# Patient Record
Sex: FEMALE | Race: WHITE | NOT HISPANIC OR LATINO | Employment: UNEMPLOYED | URBAN - METROPOLITAN AREA
[De-identification: names, ages, dates, MRNs, and addresses within clinical notes are randomized per-mention and may not be internally consistent; named-entity substitution may affect disease eponyms.]

---

## 2021-06-16 ENCOUNTER — TELEPHONE (OUTPATIENT)
Dept: SURGICAL ONCOLOGY | Facility: CLINIC | Age: 41
End: 2021-06-16

## 2021-06-16 NOTE — TELEPHONE ENCOUNTER
New Patient Encounter    New Patient Intake Form   Patient Details:  Flaco Villela  1980  [id number]    Background Information:  [address] starts by opening a telephone encounter and gathering the following information   Who is calling to schedule? If not self, relationship to patient? Patient   Referring Provider [facility]   What is the diagnosis? Shyam II   Is this Cancer or Non-Cancer? Non-Cancer - PRECANCER   Is this diagnosis confirmed? Yes   When was the diagnosis? 6/2021   Is there a confirmed diagnosis from a biopsy/tissue reviewed by pathology? Yes   Were outside slides requested? NA   Is patient aware of diagnosis? Yes   Is there a personal history and what kind? No   Is there a family history and what kind? No   Reason for visit? New Diagnosis   Have you had any imaging or labs done? If so: when, where? yes  [facility]   Are records in EPIC? no   If patient has a prior history of cancer were old records obtained? NA   Was the patient told to bring a disk? No   Does the patient smoke or Vape? No   If yes, how many packs or cartridges per day? Scheduling Information:   Preferred [place]:  [facility]     Are there any dates/time the patient cannot be seen? Miscellaneous: spoke to pt to attempt to get 's social security number  [facility] does not have it and was unable to get it -- will bring everything to appt   After completing the above information, please route to Financial Counselor and the appropriate Nurse Navigator for review

## 2021-06-21 ENCOUNTER — CONSULT (OUTPATIENT)
Dept: GYNECOLOGIC ONCOLOGY | Facility: CLINIC | Age: 41
End: 2021-06-21
Payer: COMMERCIAL

## 2021-06-21 VITALS
DIASTOLIC BLOOD PRESSURE: 64 MMHG | SYSTOLIC BLOOD PRESSURE: 118 MMHG | RESPIRATION RATE: 16 BRPM | BODY MASS INDEX: 35.63 KG/M2 | TEMPERATURE: 98.9 F | WEIGHT: 227 LBS | HEART RATE: 87 BPM | HEIGHT: 67 IN

## 2021-06-21 DIAGNOSIS — N90.1 VIN II (VULVAR INTRAEPITHELIAL NEOPLASIA II): Primary | ICD-10-CM

## 2021-06-21 PROCEDURE — 99242 OFF/OP CONSLTJ NEW/EST SF 20: CPT | Performed by: OBSTETRICS & GYNECOLOGY

## 2021-06-21 NOTE — ASSESSMENT & PLAN NOTE
Patient with abnormal skin noted towards left anterior vulva by her gynecologist   This was mostly asymptomatic  Biopsy demonstrated incidental SUSY 2  Patient presents today to discuss management options  She is not really symptomatic from this lesion  On exam, biopsy site identified but there is no true grossly abnormal skin  We discussed potential options including topical therapy with Aldara, surgical excision of scar versus observation period patient is inclined to not proceed surgically now during the summer months anyway, which I have reassured her is a safe approach on the basis of slow progressing nature of high-grade dysplasia  after considering options, she has elected and I have supported her intent to undergo expectant management  She will return to the office in 2-3 months for re-evaluation  She will see Dr Salina Fernandes at our [facility] office at that time  I have encouraged her to contact us if she develops any new symptoms

## 2021-06-21 NOTE — LETTER
2021     Jony Pratt  [address]  [address]    Patient: Katelin Dias   YOB: 1980   Date of Visit: 2021       Dear Dr Mojgan Garcia: Thank you for referring Katelin Dias to me for evaluation  Below are my notes for this consultation  If you have questions, please do not hesitate to call me  I look forward to following your patient along with you  Sincerely,        Mertha Babinski, MD        CC: [place], MD Caro Harvey, MD Mertha Babinski, MD  2021  4:03 PM  Incomplete  Assessment/Plan:    {Assess/PlanSmartLinks:82534}        CHIEF COMPLAINT:       Subjective:     Problem:  Cancer Staging  No matching staging information was found for the patient  Previous therapy:  Oncology History    No history exists  Patient ID: Katelin Dias is a 38 y o  female  HPI    Review of Systems    No current outpatient medications on file  No current facility-administered medications for this visit  Allergies   Allergen Reactions    Penicillins Hives and Swelling       History reviewed  No pertinent past medical history  Past Surgical History:   Procedure Laterality Date     SECTION      3    DILATION AND EVACUATION      HERNIA REPAIR         OB History        4    Para        Term                AB        Living   3       SAB        TAB        Ectopic        Multiple        Live Births                     Family History   Problem Relation Age of Onset    Breast cancer Mother     Melanoma Maternal Aunt     Colon cancer Paternal Grandfather        {Common ambulatory SmartLinks:32476}      Objective:    Blood pressure 118/64, pulse 87, temperature 98 9 °F (37 2 °C), temperature source Tympanic, resp  rate 16, height 5' 7" (1 702 m), weight 103 kg (227 lb), last menstrual period 2021  Body mass index is 35 55 kg/m²      Physical Exam      No results found for:   No results found for: WBC, HGB, HCT, MCV, PLT  No results found for: NA, K, CL, CO2, ANIONGAP, BUN, CREATININE, GLUCOSE, GLUF, CALCIUM, CORRECTEDCA, AST, ALT, ALKPHOS, PROT, BILITOT, EGFR     Trend:  No results found for:

## 2021-06-21 NOTE — LETTER
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       Patient with abnormal skin noted towards left anterior vulva by her gynecologist   This was mostly asymptomatic  Biopsy demonstrated incidental SUSY 2  Patient presents today to discuss management options  She is not really symptomatic from this lesion  On exam, biopsy site identified but there is no true grossly abnormal skin  We discussed potential options including topical therapy with Aldara, surgical excision of scar versus observation period patient is inclined to not proceed surgically now during the summer months anyway, which I have reassured her is a safe approach on the basis of slow progressing nature of high-grade dysplasia  after considering options, she has elected and I have supported her intent to undergo expectant management  She will return to the office in 2-3 months for re-evaluation  She will see Dr Elena Carlson at our [facility] office at that time  I have encouraged her to contact us if she develops any new symptoms  CHIEF COMPLAINT:    here for consultation management for newly diagnosed high-grade vulvar dysplasia  Patient ID: Jozef Anderson is a 38 y o  female  HPI   60-year-old  with 3 prior  sections    She presents for newly diagnosed biopsy-proven SUSY 2  She presented to her new gynecologist and evaluation included pelvic ultrasound which demonstrated endometrial stripe measuring 20 3 mm ( premenopausal)  Endometrial biopsy was performed which demonstrated secretory endometrium with no hyperplasia or atypia  Reportedly, a lesion was seen on her left anterior vulva and a biopsy was obtained  This demonstrated SUSY 2  Patient was referred for consultation  She reports some questionable diffuse irritation but nothing attributable to the biopsy site or place of the lesion  Patient  had not noticed any gross abnormalities in that area but report whitening was noted at the time of biopsy/evaluation  Review of Systems    As per HPI  Twelve point review of systems otherwise noncontributory  Asymptomatic  No current outpatient medications on file  No current facility-administered medications for this visit  Allergies   Allergen Reactions    Penicillins Hives and Swelling       History reviewed  No pertinent past medical history  Past Surgical History:   Procedure Laterality Date     SECTION      3    DILATION AND EVACUATION      HERNIA REPAIR         OB History        4    Para        Term                AB        Living   3       SAB        TAB        Ectopic        Multiple        Live Births                     Family History   Problem Relation Age of Onset    Breast cancer Mother     Melanoma Maternal Aunt     Colon cancer Paternal Grandfather        The following portions of the patient's history were reviewed and updated as appropriate: allergies, current medications, past family history, past medical history, past social history, past surgical history and problem list       Objective:    Blood pressure 118/64, pulse 87, temperature 98 9 °F (37 2 °C), temperature source Tympanic, resp   rate 16, height 5' 7" (1 702 m), weight 103 kg (227 lb), last menstrual period 06/12/2021  Body mass index is 35 55 kg/m²  Physical Exam  Vitals reviewed  Exam conducted with a chaperone present  Pulmonary:      Effort: Pulmonary effort is normal    Genitourinary:     Comments:  Left anterior biopsy site noted  No residual grossly abnormal appearance skin noted  Normal Bartholin's and [place]'s glands  Normal urethral meatus and no evidence of urethral discharge or masses  Bladder without fullness mass or tenderness  Vagina without lesion Scant brownish discharge consistent with recent menstrual flow  No significant pelvic organ prolapse noted  Cervix grossly normal appearing without visible lesions  Internal exam deferred        Jesusita Guerrero MD, [address]  6/21/2021  4:08 PM

## 2021-06-21 NOTE — PROGRESS NOTES
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       Patient with abnormal skin noted towards left anterior vulva by her gynecologist   This was mostly asymptomatic  Biopsy demonstrated incidental SUSY 2  Patient presents today to discuss management options  She is not really symptomatic from this lesion  On exam, biopsy site identified but there is no true grossly abnormal skin  We discussed potential options including topical therapy with Aldara, surgical excision of scar versus observation period patient is inclined to not proceed surgically now during the summer months anyway, which I have reassured her is a safe approach on the basis of slow progressing nature of high-grade dysplasia  after considering options, she has elected and I have supported her intent to undergo expectant management  She will return to the office in 2-3 months for re-evaluation  She will see Dr Radha Blackwell at our [facility] office at that time  I have encouraged her to contact us if she develops any new symptoms  CHIEF COMPLAINT:    here for consultation management for newly diagnosed high-grade vulvar dysplasia  Patient ID: Jeannie Giraldo is a 38 y o  female  HPI   41-year-old  with 3 prior  sections  She presents for newly diagnosed biopsy-proven SUSY 2  She presented to her new gynecologist and evaluation included pelvic ultrasound which demonstrated endometrial stripe measuring 20 3 mm ( premenopausal)  Endometrial biopsy was performed which demonstrated secretory endometrium with no hyperplasia or atypia  Reportedly, a lesion was seen on her left anterior vulva and a biopsy was obtained  This demonstrated SUSY 2  Patient was referred for consultation  She reports some questionable diffuse irritation but nothing attributable to the biopsy site or place of the lesion    Patient  had not noticed any gross abnormalities in that area but report whitening was noted at the time of biopsy/evaluation  Review of Systems    As per HPI  Twelve point review of systems otherwise noncontributory  Asymptomatic  No current outpatient medications on file  No current facility-administered medications for this visit  Allergies   Allergen Reactions    Penicillins Hives and Swelling       History reviewed  No pertinent past medical history  Past Surgical History:   Procedure Laterality Date     SECTION      3    DILATION AND EVACUATION      HERNIA REPAIR         OB History        4    Para        Term                AB        Living   3       SAB        TAB        Ectopic        Multiple        Live Births                     Family History   Problem Relation Age of Onset    Breast cancer Mother     Melanoma Maternal Aunt     Colon cancer Paternal Grandfather        The following portions of the patient's history were reviewed and updated as appropriate: allergies, current medications, past family history, past medical history, past social history, past surgical history and problem list       Objective:    Blood pressure 118/64, pulse 87, temperature 98 9 °F (37 2 °C), temperature source Tympanic, resp  rate 16, height 5' 7" (1 702 m), weight 103 kg (227 lb), last menstrual period 2021  Body mass index is 35 55 kg/m²  Physical Exam  Vitals reviewed  Exam conducted with a chaperone present  Pulmonary:      Effort: Pulmonary effort is normal    Genitourinary:     Comments:  Left anterior biopsy site noted  No residual grossly abnormal appearance skin noted  Normal Bartholin's and Solomons's glands  Normal urethral meatus and no evidence of urethral discharge or masses  Bladder without fullness mass or tenderness  Vagina without lesion Scant brownish discharge consistent with recent menstrual flow  No significant pelvic organ prolapse noted  Cervix grossly normal appearing without visible lesions  Internal exam deferred        Al Hall MD, [facility]  6/21/2021  4:08 PM

## 2021-09-22 ENCOUNTER — OFFICE VISIT (OUTPATIENT)
Dept: GYNECOLOGIC ONCOLOGY | Facility: HOSPITAL | Age: 41
End: 2021-09-22
Payer: COMMERCIAL

## 2021-09-22 VITALS
RESPIRATION RATE: 16 BRPM | HEIGHT: 67 IN | WEIGHT: 223 LBS | TEMPERATURE: 97.5 F | DIASTOLIC BLOOD PRESSURE: 70 MMHG | BODY MASS INDEX: 35 KG/M2 | SYSTOLIC BLOOD PRESSURE: 128 MMHG | OXYGEN SATURATION: 98 % | HEART RATE: 81 BPM

## 2021-09-22 DIAGNOSIS — N90.1 VIN II (VULVAR INTRAEPITHELIAL NEOPLASIA II): Primary | ICD-10-CM

## 2021-09-22 PROCEDURE — 56820 COLPOSCOPY VULVA: CPT | Performed by: OBSTETRICS & GYNECOLOGY

## 2021-09-22 NOTE — LETTER
September 22, 2021     Yue Ellis  [address]  [address]    Patient: Samuel Alvarado   YOB: 1980   Date of Visit: 9/22/2021       Dear Dr Bharathi Islas: Thank you for referring Samuel Alvarado to me for evaluation  Below are my notes for this consultation  If you have questions, please do not hesitate to call me  I look forward to following your patient along with you  Sincerely,        Rakesh French MD        CC: No Recipients  Rakesh French MD  9/22/2021 10:14 AM  Sign when Signing Visit  Colposcopy    Date/Time: 9/22/2021 10:13 AM  Performed by: Rakesh French MD  Authorized by: Rakesh French MD     Consent:     Consent obtained:  Verbal    Consent given by:  Patient  Pre-procedure:     Prepped with: acetic acid    Indication:     Indications: SUSY 2  Procedure:     Procedure: Colposcopy of Vulva only      Biopsy(s): no    Post-procedure:     Findings comment:  No dysplasia or malignancy    Impression comment:  No dysplasia or malignancy      42year-old biopsy-proven SUSY 2 from June of 2021  The margins of the biopsy were positive for dysplasia but there has been no visible lesion present since the biopsy  Repeat vulvar colposcopy today did not reveal evidence of malignancy or dysplasia  I reviewed her Pap smear, pelvic ultrasound, endometrial biopsy  Her performance status is 0   1  Return in 6 months for vulvar colposcopy

## 2021-09-22 NOTE — PROGRESS NOTES
Colposcopy    Date/Time: 9/22/2021 10:13 AM  Performed by: Sepideh Graves MD  Authorized by: Sepideh Graves MD     Consent:     Consent obtained:  Verbal    Consent given by:  Patient  Pre-procedure:     Prepped with: acetic acid    Indication:     Indications: SUSY 2  Procedure:     Procedure: Colposcopy of Vulva only      Biopsy(s): no    Post-procedure:     Findings comment:  No dysplasia or malignancy    Impression comment:  No dysplasia or malignancy      42year-old biopsy-proven SUSY 2 from June of 2021  The margins of the biopsy were positive for dysplasia but there has been no visible lesion present since the biopsy  Repeat vulvar colposcopy today did not reveal evidence of malignancy or dysplasia  I reviewed her Pap smear, pelvic ultrasound, endometrial biopsy  Her performance status is 0   1  Return in 6 months for vulvar colposcopy

## 2021-09-22 NOTE — ASSESSMENT & PLAN NOTE
40-year-old biopsy-proven SUSY 2 from June of 2021  The margins of the biopsy were positive for dysplasia but there has been no visible lesion present since the biopsy  Repeat vulvar colposcopy today did not reveal evidence of malignancy or dysplasia  I reviewed her Pap smear, pelvic ultrasound, endometrial biopsy  Her performance status is 0   1  Return in 6 months for vulvar colposcopy

## 2022-01-04 ENCOUNTER — TELEPHONE (OUTPATIENT)
Dept: GYNECOLOGIC ONCOLOGY | Facility: CLINIC | Age: 42
End: 2022-01-04

## 2022-01-04 NOTE — TELEPHONE ENCOUNTER
Left patient message to move appointment 3/23 to 3/24  Dr Bianca Disla will not be seeing patients on Wednesdays, only Thursdays in [facility]

## 2022-03-24 ENCOUNTER — OFFICE VISIT (OUTPATIENT)
Dept: GYNECOLOGIC ONCOLOGY | Facility: HOSPITAL | Age: 42
End: 2022-03-24
Payer: COMMERCIAL

## 2022-03-24 VITALS
TEMPERATURE: 97.4 F | RESPIRATION RATE: 16 BRPM | WEIGHT: 229 LBS | HEART RATE: 87 BPM | SYSTOLIC BLOOD PRESSURE: 112 MMHG | OXYGEN SATURATION: 98 % | BODY MASS INDEX: 35.94 KG/M2 | HEIGHT: 67 IN | DIASTOLIC BLOOD PRESSURE: 60 MMHG

## 2022-03-24 DIAGNOSIS — N90.1 VIN II (VULVAR INTRAEPITHELIAL NEOPLASIA II): Primary | ICD-10-CM

## 2022-03-24 PROCEDURE — 56820 COLPOSCOPY VULVA: CPT | Performed by: PHYSICIAN ASSISTANT

## 2022-03-24 PROCEDURE — 99212 OFFICE O/P EST SF 10 MIN: CPT | Performed by: PHYSICIAN ASSISTANT

## 2022-03-24 NOTE — ASSESSMENT & PLAN NOTE
21-year-old with biopsy proven SUSY 2 in June 2021  Margins of biopsy were positive for dysplasia  Vulvar colposcopy is negative  Return to the office in 6 months for vulvar coloscopy

## 2022-03-24 NOTE — PROGRESS NOTES
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     56-year-old with biopsy proven SUSY 2 in June 2021  Margins of biopsy were positive for dysplasia  Vulvar colposcopy is negative  Return to the office in 6 months for vulvar coloscopy  Relevant Orders    Colposcopy            CHIEF COMPLAINT:   Vulvar colposcopy  Problem:  SUSY 2      Previous therapy:  1  Vulvar bx, June 2021  Patient ID: Shazia Becerra is a 42 y o  female  who presents for vulvar colposcopy  She denies vulvar itching, irritation or new lesions  The patient notes she underwent hysteroscopy/D&C yesterday due to an abnormal ultrasound  Her cycles are regular  She is without acute complaints  The following portions of the patient's history were reviewed and updated as appropriate: allergies, current medications, past medical history, past surgical history and problem list     Review of Systems   Constitutional: Negative  Respiratory: Negative  Gastrointestinal: Negative  Genitourinary: Negative  Skin: Negative  Psychiatric/Behavioral: Negative  No current outpatient medications on file  No current facility-administered medications for this visit  Objective:    Blood pressure 112/60, pulse 87, temperature (!) 97 4 °F (36 3 °C), temperature source Temporal, resp  rate 16, height 5' 7" (1 702 m), weight 104 kg (229 lb), last menstrual period 03/04/2022, SpO2 98 %  Body mass index is 35 87 kg/m²  Body surface area is 2 14 meters squared  Physical Exam  Vitals reviewed  Constitutional:       General: She is not in acute distress  Appearance: Normal appearance  She is not ill-appearing  HENT:      Head: Normocephalic and atraumatic  Mouth/Throat:      Mouth: Mucous membranes are moist    Eyes:      General: No scleral icterus  Right eye: No discharge           Left eye: No discharge  Conjunctiva/sclera: Conjunctivae normal    Pulmonary:      Effort: Pulmonary effort is normal    Genitourinary:     Comments: See colposcopy note  Musculoskeletal:      Right lower leg: No edema  Left lower leg: No edema  Skin:     General: Skin is warm and dry  Coloration: Skin is not jaundiced  Findings: No rash  Neurological:      General: No focal deficit present  Mental Status: She is alert and oriented to person, place, and time  Cranial Nerves: No cranial nerve deficit  Sensory: No sensory deficit  Motor: No weakness  Gait: Gait normal    Psychiatric:         Mood and Affect: Mood normal          Behavior: Behavior normal          Thought Content: Thought content normal          Judgment: Judgment normal          Colposcopy    Date/Time: 3/24/2022 1:47 PM  Performed by: Ro Mix PA-C  Authorized by: Ro Mix PA-C     Consent:     Consent obtained:  Verbal  Procedure:     Procedure: Colposcopy of Vulva only    Comments:      External female genitalia examined and grossly normal  Bathed in 5% acetic acid  No acetowhite changes  No biopsies performed  Patient tolerated the procedure well

## 2022-09-22 ENCOUNTER — OFFICE VISIT (OUTPATIENT)
Dept: GYNECOLOGIC ONCOLOGY | Facility: HOSPITAL | Age: 42
End: 2022-09-22
Payer: COMMERCIAL

## 2022-09-22 VITALS
HEART RATE: 102 BPM | SYSTOLIC BLOOD PRESSURE: 144 MMHG | HEIGHT: 67 IN | TEMPERATURE: 97.1 F | DIASTOLIC BLOOD PRESSURE: 70 MMHG | BODY MASS INDEX: 37.67 KG/M2 | RESPIRATION RATE: 16 BRPM | OXYGEN SATURATION: 98 % | WEIGHT: 240 LBS

## 2022-09-22 DIAGNOSIS — N90.1 VIN II (VULVAR INTRAEPITHELIAL NEOPLASIA II): Primary | ICD-10-CM

## 2022-09-22 PROCEDURE — 56821 COLPOSCOPY VULVA W/BIOPSY: CPT | Performed by: OBSTETRICS & GYNECOLOGY

## 2022-09-22 PROCEDURE — 88305 TISSUE EXAM BY PATHOLOGIST: CPT | Performed by: PATHOLOGY

## 2022-09-22 NOTE — PROGRESS NOTES
Colposcopy     Date/Time 9/22/2022 2:22 PM     Universal Protocol   Consent: Verbal consent obtained  Consent given by: patient  Patient understanding: patient states understanding of the procedure being performed  Patient identity confirmed: verbally with patient       Performed by  Amanda Lopez MD     Authorized by Amanda Lopez MD        Pre-procedure details     Prepped with: acetic acid       Indication    Indications: SUSY 3  Procedure Details   Procedure: Colposcopy of Vulva with Biopsy      Specimen to pathology: yes       Post-procedure      Findings comment:  AWE post left vulva    Impression comment:  SUSY 1-2     Comments       5% ascetic acid applied to the entire vulvar skin  There was an area measuring approximately 1 5 x 1 5 cm with aceto-white changes on the left casimiro vulva adjacent to the perineal body  There was also evidence of hypertrophy in that area  The site was injected with 1% lidocaine and a 3 mm punch biopsy obtained  Hemostasis was achieved using a single interrupted 4-0 Monocryl suture  She tolerated the biopsy well  No complications  41-year-old with a history of SUSY 2  Her last treatment was biopsy only in June 2021  Colposcopy today revealed a lesion consistent with SUSY 1-2 on the left casimiro vulva adjacent to the perineal body  Biopsy obtained  Performance status is 0   1  Follow-up results of biopsy and plan treatment accordingly

## 2022-09-22 NOTE — LETTER
September 22, 2022     Queta Body  [address]  [address]    Patient: Graham Cornell   YOB: 1980   Date of Visit: 9/22/2022       Dear Dr Christianson: Thank you for referring Graham Cornell to me for evaluation  Below are my notes for this consultation  If you have questions, please do not hesitate to call me  I look forward to following your patient along with you  Sincerely,        Fadumo Valle MD        CC: No Recipients  Fadumo Valle MD  9/22/2022  2:23 PM  Sign when Signing Visit     Colposcopy     Date/Time 9/22/2022 2:22 PM     Universal Protocol   Consent: Verbal consent obtained  Consent given by: patient  Patient understanding: patient states understanding of the procedure being performed  Patient identity confirmed: verbally with patient       Performed by  Fadumo Valle MD     Authorized by Fadumo Valle MD        Pre-procedure details     Prepped with: acetic acid       Indication    Indications: SUSY 3  Procedure Details   Procedure: Colposcopy of Vulva with Biopsy      Specimen to pathology: yes       Post-procedure      Findings comment:  AWE post left vulva    Impression comment:  SUSY 1-2     Comments       5% ascetic acid applied to the entire vulvar skin  There was an area measuring approximately 1 5 x 1 5 cm with aceto-white changes on the left casimiro vulva adjacent to the perineal body  There was also evidence of hypertrophy in that area  The site was injected with 1% lidocaine and a 3 mm punch biopsy obtained  Hemostasis was achieved using a single interrupted 4-0 Monocryl suture  She tolerated the biopsy well  No complications  65-year-old with a history of SUSY 2  Her last treatment was biopsy only in June 2021  Colposcopy today revealed a lesion consistent with SUSY 1-2 on the left casimiro vulva adjacent to the perineal body  Biopsy obtained    Performance status is 0   1  Follow-up results of biopsy and plan treatment accordingly

## 2022-09-22 NOTE — ASSESSMENT & PLAN NOTE
41-year-old with a history of SUSY 2  Her last treatment was biopsy only in June 2021  Colposcopy today revealed a lesion consistent with SUSY 1-2 on the left casimiro vulva adjacent to the perineal body  Biopsy obtained  Performance status is 0   1  Follow-up results of biopsy and plan treatment accordingly

## 2023-05-31 ENCOUNTER — TELEPHONE (OUTPATIENT)
Dept: HEMATOLOGY ONCOLOGY | Facility: CLINIC | Age: 43
End: 2023-05-31

## 2023-05-31 NOTE — TELEPHONE ENCOUNTER
Appointment Schedule   Who are you speaking with? Patient   If it is not the patient, are they listed on an active communication consent form? N/A   Which provider is the appointment scheduled with? Dr Cande Lim   At which location is the appointment scheduled for? [place]   When is the appointment scheduled? Please list date and time 06/29 at 10:15am    What is the reason for this appointment? Jean Follow up    Did patient voice understanding of the details of this appointment? Yes   Was the no show policy reviewed with patient?  Yes

## 2023-06-20 ENCOUNTER — TELEPHONE (OUTPATIENT)
Dept: HEMATOLOGY ONCOLOGY | Facility: CLINIC | Age: 43
End: 2023-06-20

## 2023-06-20 NOTE — TELEPHONE ENCOUNTER
Appointment Change  Cancel, Reschedule, Change to Virtual      Who are you speaking with? Patient   If it is not the patient, are they listed on an active communication consent form? N/A   Which provider is the appointment scheduled with? Dr Enrique Uriostegui   When is the appointment scheduled? Please list date and time  06/29/2023 @10:15AM    At which location is the appointment scheduled to take place? [place]   Was the appointment rescheduled or changed from an in person visit to a virtual visit? If so, please list the details of the change  Yes, 06/22/2023 @ 2:15PM    What is the reason for the appointment change? Scheduling conflict    Was [facility] transport scheduled for this visit? No   Does [facility] transport need to be scheduled for the new visit (if applicable) No   Does the patient need an infusion appointment rescheduled? No   Does the patient have an infusion appointment scheduled? If so, when? No   Is the patient undergoing chemotherapy? No   Was the no-show policy reviewed for appointments being changed with less then 24 hours of notice?  No

## 2023-06-22 ENCOUNTER — OFFICE VISIT (OUTPATIENT)
Age: 43
End: 2023-06-22

## 2023-06-22 VITALS
WEIGHT: 238 LBS | DIASTOLIC BLOOD PRESSURE: 80 MMHG | RESPIRATION RATE: 18 BRPM | HEIGHT: 67 IN | SYSTOLIC BLOOD PRESSURE: 140 MMHG | TEMPERATURE: 98 F | BODY MASS INDEX: 37.35 KG/M2 | HEART RATE: 83 BPM | OXYGEN SATURATION: 98 %

## 2023-06-22 DIAGNOSIS — N90.1 VIN II (VULVAR INTRAEPITHELIAL NEOPLASIA II): Primary | ICD-10-CM

## 2023-06-22 PROCEDURE — 88305 TISSUE EXAM BY PATHOLOGIST: CPT | Performed by: PATHOLOGY

## 2023-06-22 PROCEDURE — 88312 SPECIAL STAINS GROUP 1: CPT | Performed by: PATHOLOGY

## 2023-06-22 PROCEDURE — 88342 IMHCHEM/IMCYTCHM 1ST ANTB: CPT | Performed by: PATHOLOGY

## 2023-06-22 PROCEDURE — 88344 IMHCHEM/IMCYTCHM EA MLT ANTB: CPT | Performed by: PATHOLOGY

## 2023-06-22 RX ORDER — FAMOTIDINE 10 MG
20 TABLET ORAL 2 TIMES DAILY
COMMUNITY

## 2023-06-22 RX ORDER — CLOBETASOL PROPIONATE 0.5 MG/G
OINTMENT TOPICAL
COMMUNITY
Start: 2023-03-29

## 2023-06-22 NOTE — LETTER
June 22, 2023     [employer]  [address]  [facility]    Patient: Nohemi Nelson   YOB: 1980   Date of Visit: 6/22/2023       Dear Dr Aaron Canales: Thank you for referring Nohemi Nelson to me for evaluation  Below are my notes for this consultation  If you have questions, please do not hesitate to call me  I look forward to following your patient along with you  Sincerely,        Josiah Perales MD        CC: No Recipients    Josiah Perales MD  6/22/2023  4:55 PM  Sign when Signing Visit     Colposcopy     Date/Time 6/22/2023 2:15 PM     Universal Protocol   Consent: Verbal consent obtained  Consent given by: patient  Patient understanding: patient states understanding of the procedure being performed  Patient identity confirmed: verbally with patient       Performed by  Josiah Perales MD   Authorized by Josiah Perales MD       Pre-procedure details     Prepped with: acetic acid      Local anesthetic:  Lidocaine 1% w/o epi     Indication    Indications: SUSY 2  Procedure Details   Procedure: Colposcopy of Vulva with Biopsy      Biopsy(s): yes      Location:  Right perineal body    Specimen to pathology: yes       Post-procedure      Findings: White epithelium      Findings comment:  Papillations at the right perineal body    Impression comment:  SUSY 1 versus inflammatory change  Patient tolerance of procedure: Tolerated well, no immediate complications     Comments       36year-old with a history of SUSY 2  Her last treatment was biopsy only in June 2021  Last colposcopic biopsy on 3/62/1650 revealed lichen simplex chronicus  Vulvar colposcopy today revealed acetowhite epithelium around the introitus consistent with inflammatory changes  There was a papillary lesion present at the right perineal body  Biopsy was obtained    Her performance status is 0   1   Follow-up results of vulvar biopsy and plan treatment accordingly  2   We discussed using clobetasol ointment to reduce inflammatory change nightly for 6 weeks  She will then follow-up for repeat colposcopy

## 2023-06-22 NOTE — PROGRESS NOTES
Colposcopy     Date/Time 6/22/2023 2:15 PM     Universal Protocol   Consent: Verbal consent obtained  Consent given by: patient  Patient understanding: patient states understanding of the procedure being performed  Patient identity confirmed: verbally with patient       Performed by  Ziggy Duarte MD   Authorized by Ziggy Duarte MD       Pre-procedure details     Prepped with: acetic acid      Local anesthetic:  Lidocaine 1% w/o epi     Indication    Indications: SUSY 2  Procedure Details   Procedure: Colposcopy of Vulva with Biopsy      Biopsy(s): yes      Location:  Right perineal body    Specimen to pathology: yes       Post-procedure      Findings: White epithelium      Findings comment:  Papillations at the right perineal body    Impression comment:  SUSY 1 versus inflammatory change  Patient tolerance of procedure: Tolerated well, no immediate complications     Comments       36year-old with a history of SUSY 2  Her last treatment was biopsy only in June 2021  Last colposcopic biopsy on 3/03/4037 revealed lichen simplex chronicus  Vulvar colposcopy today revealed acetowhite epithelium around the introitus consistent with inflammatory changes  There was a papillary lesion present at the right perineal body  Biopsy was obtained  Her performance status is 0   1   Follow-up results of vulvar biopsy and plan treatment accordingly  2   We discussed using clobetasol ointment to reduce inflammatory change nightly for 6 weeks  She will then follow-up for repeat colposcopy

## 2023-06-22 NOTE — ASSESSMENT & PLAN NOTE
40-year-old with a history of SUSY 2  Her last treatment was biopsy only in June 2021  Last colposcopic biopsy on 6/05/5325 revealed lichen simplex chronicus  Vulvar colposcopy today revealed acetowhite epithelium around the introitus consistent with inflammatory changes  There was a papillary lesion present at the right perineal body  Biopsy was obtained  Her performance status is 0   1   Follow-up results of vulvar biopsy and plan treatment accordingly  2   We discussed using clobetasol ointment to reduce inflammatory change nightly for 6 weeks  She will then follow-up for repeat colposcopy

## 2023-07-03 PROCEDURE — 88312 SPECIAL STAINS GROUP 1: CPT | Performed by: PATHOLOGY

## 2023-07-03 PROCEDURE — 88305 TISSUE EXAM BY PATHOLOGIST: CPT | Performed by: PATHOLOGY

## 2023-07-03 PROCEDURE — 88342 IMHCHEM/IMCYTCHM 1ST ANTB: CPT | Performed by: PATHOLOGY

## 2023-07-03 PROCEDURE — 88344 IMHCHEM/IMCYTCHM EA MLT ANTB: CPT | Performed by: PATHOLOGY

## 2023-08-03 ENCOUNTER — OFFICE VISIT (OUTPATIENT)
Age: 43
End: 2023-08-03

## 2023-08-03 VITALS
HEIGHT: 67 IN | WEIGHT: 238.8 LBS | HEART RATE: 86 BPM | OXYGEN SATURATION: 97 % | DIASTOLIC BLOOD PRESSURE: 76 MMHG | RESPIRATION RATE: 18 BRPM | BODY MASS INDEX: 37.48 KG/M2 | SYSTOLIC BLOOD PRESSURE: 122 MMHG | TEMPERATURE: 98.7 F

## 2023-08-03 DIAGNOSIS — N90.1 VIN II (VULVAR INTRAEPITHELIAL NEOPLASIA II): Primary | ICD-10-CM

## 2023-08-03 RX ORDER — OMEPRAZOLE 20 MG/1
20 CAPSULE, DELAYED RELEASE ORAL DAILY
COMMUNITY
Start: 2023-06-28

## 2023-08-03 RX ORDER — DIPHENOXYLATE HYDROCHLORIDE AND ATROPINE SULFATE 2.5; .025 MG/1; MG/1
1 TABLET ORAL DAILY
COMMUNITY

## 2023-08-03 NOTE — PROGRESS NOTES
Colposcopy     Date/Time 8/3/2023 10:15 AM     Universal Protocol   Consent: Verbal consent obtained. Consent given by: patient  Patient understanding: patient states understanding of the procedure being performed  Patient identity confirmed: verbally with patient       Performed by  Nelson Weir MD   Authorized by Nelson Weir MD       Pre-procedure details     Prepped with: acetic acid       Indication    Indications: vin2. Procedure Details   Procedure: Colposcopy of Vulva only       Post-procedure      Findings: White epithelium      Impression comment:  Inflammatory change. Patient tolerance of procedure: Tolerated well, no immediate complications     Comments       36year-old with a history of SUSY 2. Her last treatment was biopsy only in June 2021. Colposcopic biopsy 0/76/4655 revealed lichen simplex chronicus. Biopsy 6/22/2023 revealed seborrheic keratosis without evidence of dysplasia. Colposcopy today revealed acetowhite epithelium around the introitus consistent with inflammatory changes, stable from previous. The perineal body lesion is smaller. Her performance status is 0.  1.  Return in 6 months for vulvar colposcopy. 2.  Continue using clobetasol twice weekly or as needed.

## 2023-08-03 NOTE — ASSESSMENT & PLAN NOTE
25-year-old with a history of SUSY 2. Her last treatment was biopsy only in June 2021. Colposcopic biopsy 3/06/0840 revealed lichen simplex chronicus. Biopsy 6/22/2023 revealed seborrheic keratosis without evidence of dysplasia. Colposcopy today revealed acetowhite epithelium around the introitus consistent with inflammatory changes, stable from previous. The perineal body lesion is smaller. Her performance status is 0.  1.  Return in 6 months for vulvar colposcopy. 2.  Continue using clobetasol twice weekly or as needed.

## 2023-12-27 ENCOUNTER — OFFICE VISIT (OUTPATIENT)
Dept: URGENT CARE | Facility: CLINIC | Age: 43
End: 2023-12-27
Payer: COMMERCIAL

## 2023-12-27 VITALS
RESPIRATION RATE: 19 BRPM | HEART RATE: 87 BPM | SYSTOLIC BLOOD PRESSURE: 155 MMHG | OXYGEN SATURATION: 99 % | TEMPERATURE: 98.9 F | DIASTOLIC BLOOD PRESSURE: 79 MMHG

## 2023-12-27 DIAGNOSIS — J02.0 STREP PHARYNGITIS: Primary | ICD-10-CM

## 2023-12-27 DIAGNOSIS — J02.9 SORE THROAT: ICD-10-CM

## 2023-12-27 LAB — S PYO AG THROAT QL: NEGATIVE

## 2023-12-27 PROCEDURE — 99283 EMERGENCY DEPT VISIT LOW MDM: CPT | Performed by: FAMILY MEDICINE

## 2023-12-27 PROCEDURE — G0382 LEV 3 HOSP TYPE B ED VISIT: HCPCS | Performed by: FAMILY MEDICINE

## 2023-12-27 PROCEDURE — 87070 CULTURE OTHR SPECIMN AEROBIC: CPT | Performed by: FAMILY MEDICINE

## 2023-12-27 PROCEDURE — 87880 STREP A ASSAY W/OPTIC: CPT | Performed by: FAMILY MEDICINE

## 2023-12-27 RX ORDER — CEPHALEXIN 500 MG/1
500 CAPSULE ORAL EVERY 12 HOURS SCHEDULED
Qty: 20 CAPSULE | Refills: 0 | Status: SHIPPED | OUTPATIENT
Start: 2023-12-27 | End: 2024-01-06

## 2023-12-27 NOTE — PROGRESS NOTES
[facility]        NAME: Jacklyn De Santiago is a 43 y.o. female  : 1980    MRN: [id number]  DATE: 2023  TIME: 4:48 PM    Assessment and Plan   Strep pharyngitis [J02.0]  1. Strep pharyngitis  cephalexin (KEFLEX) 500 mg capsule      2. Sore throat  POCT rapid strepA            Patient Instructions       Follow up with PCP in 3-5 days.  Proceed to  ER if symptoms worsen.    Chief Complaint     Chief Complaint   Patient presents with    Sore Throat     Sore throat, started late last night. Glands feel swollen. Ears feel irritated. Slight clearing of the throat. headaches         History of Present Illness       43-year-old female with 2-day history of sore throat and painful swallowing.        Review of Systems   Review of Systems   Constitutional: Negative.    HENT:  Positive for sore throat.    Eyes: Negative.    Respiratory: Negative.     Cardiovascular: Negative.    Gastrointestinal: Negative.    Genitourinary: Negative.    Musculoskeletal: Negative.    Skin: Negative.    Allergic/Immunologic: Negative.    Neurological: Negative.    Hematological: Negative.    Psychiatric/Behavioral: Negative.           Current Medications       Current Outpatient Medications:     cephalexin (KEFLEX) 500 mg capsule, Take 1 capsule (500 mg total) by mouth every 12 (twelve) hours for 10 days, Disp: 20 capsule, Rfl: 0    clobetasol (TEMOVATE) 0.05 % ointment, PLEASE SEE ATTACHED FOR DETAILED DIRECTIONS, Disp: , Rfl:     famotidine (PEPCID) 10 mg tablet, Take 20 mg by mouth 2 (two) times a day, Disp: , Rfl:     multivitamin (THERAGRAN) TABS, Take 1 tablet by mouth daily, Disp: , Rfl:     omeprazole (PriLOSEC) 20 mg delayed release capsule, Take 20 mg by mouth daily, Disp: , Rfl:     Current Allergies     Allergies as of 2023 - Reviewed 2023   Allergen Reaction Noted    Penicillins Hives and Swelling 2015            The following portions of the patient's history were reviewed and updated as  appropriate: allergies, current medications, past family history, past medical history, past social history, past surgical history and problem list.     History reviewed. No pertinent past medical history.    Past Surgical History:   Procedure Laterality Date     SECTION      3    DILATION AND EVACUATION      HERNIA REPAIR         Family History   Problem Relation Age of Onset    Breast cancer Mother     Melanoma Maternal Aunt     Colon cancer Paternal Grandfather          Medications have been verified.        Objective   /79   Pulse 87   Temp 98.9 °F (37.2 °C)   Resp 19   SpO2 99%   No LMP recorded.       Physical Exam     Physical Exam  Vitals and nursing note reviewed.   Constitutional:       Appearance: She is well-developed.   HENT:      Head: Normocephalic.      Nose: Nose normal.      Mouth/Throat:      Pharynx: Pharyngeal swelling, oropharyngeal exudate and posterior oropharyngeal erythema present.   Eyes:      Pupils: Pupils are equal, round, and reactive to light.   Cardiovascular:      Rate and Rhythm: Normal rate.   Pulmonary:      Effort: Pulmonary effort is normal.   Abdominal:      General: Abdomen is flat.   Musculoskeletal:         General: Normal range of motion.      Cervical back: Normal range of motion.   Skin:     General: Skin is warm and dry.   Neurological:      Mental Status: She is alert and oriented to person, place, and time.

## 2023-12-29 LAB — BACTERIA THROAT CULT: NORMAL

## 2024-02-08 ENCOUNTER — OFFICE VISIT (OUTPATIENT)
Age: 44
End: 2024-02-08

## 2024-02-08 VITALS
BODY MASS INDEX: 38.58 KG/M2 | TEMPERATURE: 98 F | HEART RATE: 108 BPM | WEIGHT: 245.8 LBS | SYSTOLIC BLOOD PRESSURE: 120 MMHG | RESPIRATION RATE: 18 BRPM | DIASTOLIC BLOOD PRESSURE: 78 MMHG | OXYGEN SATURATION: 96 % | HEIGHT: 67 IN

## 2024-02-08 DIAGNOSIS — N90.1 VIN II (VULVAR INTRAEPITHELIAL NEOPLASIA II): Primary | ICD-10-CM

## 2024-02-08 DIAGNOSIS — B37.31 VAGINAL CANDIDIASIS: ICD-10-CM

## 2024-02-08 PROCEDURE — 88312 SPECIAL STAINS GROUP 1: CPT | Performed by: PATHOLOGY

## 2024-02-08 PROCEDURE — 88305 TISSUE EXAM BY PATHOLOGIST: CPT | Performed by: PATHOLOGY

## 2024-02-08 PROCEDURE — 88344 IMHCHEM/IMCYTCHM EA MLT ANTB: CPT | Performed by: PATHOLOGY

## 2024-02-08 RX ORDER — FLUCONAZOLE 150 MG/1
150 TABLET ORAL ONCE
Qty: 2 TABLET | Refills: 0 | Status: SHIPPED | OUTPATIENT
Start: 2024-02-08 | End: 2024-02-08

## 2024-02-08 NOTE — ASSESSMENT & PLAN NOTE
43-year-old with with a history of SUSY 2, lichen simplex chronicus, seborrheic keratosis.  Colposcopy of the vulva today revealed acetowhite epithelium at the left superior vulva consistent with inflammatory change.  There was also acetowhite epithelium at the introitus consistent with inflammatory change, stable.  Biopsy obtained of the left superior vulvar lesion.  Her performance status is 0.  1.  Follow-up results of vulvar biopsy and plan treatment accordingly.  2.  She was encouraged to continue using clobetasol ointment 1 week after the biopsy.

## 2024-02-08 NOTE — PROGRESS NOTES
Colposcopy     Date/Time  2/8/2024 1:45 PM     Universal Protocol   Consent: Verbal consent obtained.  Consent given by: patient  Patient identity confirmed: verbally with patient     Performed by  Trevor Blancas MD   Authorized by  Trevor Blancas MD     Pre-procedure details      Local anesthetic:  Lidocaine 1% w/o epi   Indication    Indications: SUSY 2.   Procedure Details   Procedure: Colposcopy of Vulva with Biopsy      Biopsy(s): yes      Location:  Left superior vulva   Post-procedure      Findings: White epithelium      Impression comment:  Inflammation    Patient tolerance of procedure:  Tolerated well, no immediate complications   Comments       Application of 5% acetic acid revealed a 2 mm focus of raised acetowhite epithelium of the left superior vulva with a small skin fissure at that location.  There was additional acetowhite epithelium at the posterior fourchette which did not have the appearance of dysplasia and was more consistent with inflammatory change.  A 3 mm punch biopsy was obtained.  Hemostasis was achieved using a single interrupted 3-0 Vicryl suture.  Speculum examination revealed a thick white discharge with clumping consistent with possible vaginal candidal infection.  43-year-old with with a history of SUSY 2, lichen simplex chronicus, seborrheic keratosis.  Colposcopy of the vulva today revealed acetowhite epithelium at the left superior vulva consistent with inflammatory change.  There was also acetowhite epithelium at the introitus consistent with inflammatory change, stable.  Biopsy obtained of the left superior vulvar lesion.  Her performance status is 0.  1.  Follow-up results of vulvar biopsy and plan treatment accordingly.  2.  She was encouraged to continue using clobetasol ointment 1 week after the biopsy.

## 2024-08-08 ENCOUNTER — OFFICE VISIT (OUTPATIENT)
Age: 44
End: 2024-08-08
Payer: COMMERCIAL

## 2024-08-08 VITALS
BODY MASS INDEX: 38.14 KG/M2 | HEART RATE: 82 BPM | DIASTOLIC BLOOD PRESSURE: 84 MMHG | RESPIRATION RATE: 18 BRPM | TEMPERATURE: 98.9 F | SYSTOLIC BLOOD PRESSURE: 118 MMHG | HEIGHT: 67 IN | WEIGHT: 243 LBS | OXYGEN SATURATION: 98 %

## 2024-08-08 DIAGNOSIS — N90.1 VIN II (VULVAR INTRAEPITHELIAL NEOPLASIA II): Primary | ICD-10-CM

## 2024-08-08 PROCEDURE — 56820 COLPOSCOPY VULVA: CPT | Performed by: STUDENT IN AN ORGANIZED HEALTH CARE EDUCATION/TRAINING PROGRAM

## 2024-08-08 RX ORDER — SENNOSIDES 8.6 MG
650 CAPSULE ORAL AS NEEDED
COMMUNITY

## 2024-08-08 NOTE — PROGRESS NOTES
Colposcopy     Date/Time  8/8/2024 9:45 AM     [place] Protocol   Consent: Verbal consent obtained.  Risks and benefits: risks, benefits and alternatives were discussed  Consent given by: patient  Patient understanding: patient states understanding of the procedure being performed  Patient identity confirmed: verbally with patient     Performed by  Jorge Delatorre MD   Authorized by  Trevor Blancas MD     Pre-procedure details      Prepped with: acetic acid     Indication    LSIL   Procedure Details   Procedure: Colposcopy of Vulva only      Under satisfactory analgesia the patient was prepped and draped in the dorsal lithotomy position: yes      Biopsy(s): no     Post-procedure      Findings: White epithelium      Findings comment:  Thin, white epithelium at 12-1 o'clock position    Impression comment:  LSIL c/w prior biopsy    Patient tolerance of procedure:  Tolerated well, no immediate complications   Comments       Application of 5% acetic acid revealed a 2 mm focus of raised acetowhite epithelium of the base of the left superior vulva at 12- 1 o'clock. There was no evidence of acetowhite at the posterior fourchette. A speculum exam was not performed.     44 y.o.  with a history of SUSY 2, lichen simplex chronicus, seborrheic keratosis.  Most recent biopsy in 2/2024 demonstrated LSIL. Colposcopy of the vulva today revealed acetowhite epithelium at the left superior vulva consistent with LSIL, consistent with prior biopsy result.  Her performance status is 0.  1.   We reviewed the pathogenesis of LSIL of the vulva. We recommended continued surveillance or consideration of use of Aldara for treatment of LSIL. We reviewed the side effects of vulvar irritation, burning, erythema, and systemic flu-like symptoms. She elects to continue surveillance. Return to office for repeat colposcopy in 6 months   2.  She was encouraged to continue using clobetasol ointment as needed for vulvar itching or  irritation.      Dr. Blancas was present and participated in the colposcopy procedure.

## 2025-02-05 ENCOUNTER — TELEPHONE (OUTPATIENT)
Age: 45
End: 2025-02-05

## 2025-02-05 NOTE — TELEPHONE ENCOUNTER
Pt would like to inform Dr. Blancas that based on some her recent labs her PCP recommended possibly to have a pelvis scan ordered. Pt stated that her PCP stated they were going to fax some notes over to Dr. Blancas's office but pt will also bring that documentation with her to her appt tomorrow so that it can be reviewed.

## 2025-02-05 NOTE — TELEPHONE ENCOUNTER
Pt calling in regarding her appointment tomorrow with Dr. Blancas. Pt nervous with the weather conditions expected for tomorrow and inquired about rescheduling, Dr. Blancas's next available in  was not until April. Pt did not want to wait that long for a follow up, offered sooner appointments in [place], as pt is coming from [place],  is closest. Dr. Blancas did have later appointments available as well that I offered, pt stated she will call back and think about it as she would have to bring her 10 year old twins with her to the appointment as they have an appointment in [place] as well. Informed pt that she can call us back once she decides and whomever answers will be able to assist her as well if the appointments with Dr. Blancas are still available. Pt verbalized understanding.       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

## 2025-02-06 ENCOUNTER — OFFICE VISIT (OUTPATIENT)
Age: 45
End: 2025-02-06
Payer: COMMERCIAL

## 2025-02-06 VITALS
TEMPERATURE: 98.5 F | OXYGEN SATURATION: 98 % | DIASTOLIC BLOOD PRESSURE: 78 MMHG | HEIGHT: 67 IN | SYSTOLIC BLOOD PRESSURE: 128 MMHG | BODY MASS INDEX: 38.77 KG/M2 | RESPIRATION RATE: 18 BRPM | WEIGHT: 247 LBS | HEART RATE: 89 BPM

## 2025-02-06 DIAGNOSIS — D50.0 IRON DEFICIENCY ANEMIA DUE TO CHRONIC BLOOD LOSS: ICD-10-CM

## 2025-02-06 DIAGNOSIS — N90.1 VIN II (VULVAR INTRAEPITHELIAL NEOPLASIA II): Primary | ICD-10-CM

## 2025-02-06 DIAGNOSIS — Z12.11 ENCOUNTER FOR SCREENING COLONOSCOPY: ICD-10-CM

## 2025-02-06 PROCEDURE — 99214 OFFICE O/P EST MOD 30 MIN: CPT | Performed by: OBSTETRICS & GYNECOLOGY

## 2025-02-06 PROCEDURE — 56820 COLPOSCOPY VULVA: CPT | Performed by: OBSTETRICS & GYNECOLOGY

## 2025-02-06 RX ORDER — DIPHENHYDRAMINE HCL 25 MG
25 CAPSULE ORAL AS NEEDED
COMMUNITY

## 2025-02-06 NOTE — ASSESSMENT & PLAN NOTE
Hemoglobin 9.7 g/dL with decreased ferritin on 1/24/2025.  She is not currently having abnormal vaginal bleeding.  Pelvic examination was benign.  1.  Plan for transvaginal and pelvic ultrasound as well as CT imaging.  2.  She will follow-up with gastroenterology for colonoscopy as scheduled.  Orders:    US pelvis complete w transvaginal; Future    CT abdomen pelvis w contrast; Future    Ambulatory Referral to Gastroenterology; Future

## 2025-02-06 NOTE — LETTER
2025     Marilin Berry  [address]  [address]  [place] [address]    Patient: Jacklyn De Santiago   YOB: 1980   Date of Visit: 2025       Dear Dr. Berry:    Thank you for referring Jacklyn De Santiago to me for evaluation. Below are my notes for this consultation.    If you have questions, please do not hesitate to call me. I look forward to following your patient along with you.         Sincerely,        Trevor Blancas MD        CC: MD Trevor Gonsales MD  2025  2:21 PM  Sign when Signing Visit  Name: Jacklyn De Santiago      : 1980      MRN: [id number]  Encounter Provider: Trevor Blancas MD  Encounter Date: 2025   Encounter department: [facility] GYNECOLOGY ONCOL[facility]  :  Assessment & Plan  SUSY II (vulvar intraepithelial neoplasia II)  44-year-old with a history of SUSY 2, lichen simplex chronicus, seborrheic keratosis.  Colposcopy of the vulva did not reveal any recurrent dysplasia or malignancy.  Last vulvar biopsy was 2024 and was consistent with SUSY 1.  Performance status is 0.  1.  Return in 6 months for vulvar colposcopy       Iron deficiency anemia due to chronic blood loss  Hemoglobin 9.7 g/dL with decreased ferritin on 2025.  She is not currently having abnormal vaginal bleeding.  Pelvic examination was benign.  1.  Plan for transvaginal and pelvic ultrasound as well as CT imaging.  2.  She will follow-up with gastroenterology for colonoscopy as scheduled.  Orders:  •  US pelvis complete w transvaginal; Future  •  CT abdomen pelvis w contrast; Future  •  Ambulatory Referral to Gastroenterology; Future    Encounter for screening colonoscopy  See above  Orders:  •  Ambulatory Referral to Gastroenterology; Future            History of Present Illness  Reason for Visit / CC: Follow-up SUSY 2/SUSY 1.  New diagnosis of iron deficiency anemia   Jacklyn De Santiago is a 44  y.o. female   Who returns for evaluation of vulvar dysplasia.  She has not been using clobetasol ointment recently.  She does not have vulvar itching or burning.  She was also noted to have anemia with a hemoglobin of 9.7 g/dL on 1/24/2025.  CMP was normal.  Ferritin was noted to be low.  She has reached out to gastroenterology and is going to have a colonoscopy.  She is not currently having abnormal vaginal bleeding.  She was concerned that maybe there was some blood in the stool.  She is otherwise able to perform her actives of daily living without difficulty.      Pertinent Medical History        Review of Systems   Constitutional:  Negative for activity change and unexpected weight change.   HENT: Negative.     Eyes: Negative.    Respiratory: Negative.     Cardiovascular: Negative.    Gastrointestinal:  Positive for blood in stool. Negative for abdominal distention and abdominal pain.   Endocrine: Negative.    Genitourinary:  Negative for pelvic pain and vaginal bleeding.   Musculoskeletal: Negative.    Skin: Negative.    Allergic/Immunologic: Negative.    Neurological: Negative.    Hematological: Negative.    Psychiatric/Behavioral: Negative.      A complete review of systems is negative other than that noted above in the HPI.  Current Outpatient Medications on File Prior to Visit   Medication Sig Dispense Refill   • acetaminophen (TYLENOL) 650 mg CR tablet Take 650 mg by mouth as needed for mild pain     • clobetasol (TEMOVATE) 0.05 % ointment PLEASE SEE ATTACHED FOR DETAILED DIRECTIONS     • diphenhydrAMINE (BENADRYL) 25 mg capsule Take 25 mg by mouth as needed for itching     • famotidine (PEPCID) 10 mg tablet Take 20 mg by mouth 2 (two) times a day     • omeprazole (PriLOSEC) 20 mg delayed release capsule Take 20 mg by mouth daily     • multivitamin (THERAGRAN) TABS Take 1 tablet by mouth daily (Patient not taking: Reported on 2/6/2025)       No current facility-administered medications on file prior to  "visit.         Objective  /78 (BP Location: Left arm, Patient Position: Sitting, Cuff Size: Large)   Pulse 89   Temp 98.5 °F (36.9 °C)   Resp 18   Ht 5' 7\" (1.702 m)   Wt 112 kg (247 lb)   SpO2 98%   BMI 38.69 kg/m²     Body mass index is 38.69 kg/m².  Pain Screening:  Pain Score: 0-No pain  ECOG   0  Physical Exam  Vitals reviewed. Exam conducted with a chaperone present.   Constitutional:       General: She is not in acute distress.     Appearance: Normal appearance. She is well-developed. She is not ill-appearing, toxic-appearing or diaphoretic.   HENT:      Head: Normocephalic and atraumatic.   Eyes:      General: No scleral icterus.     Extraocular Movements: Extraocular movements intact.      Conjunctiva/sclera: Conjunctivae normal.   Neck:      Thyroid: No thyromegaly.   Pulmonary:      Effort: Pulmonary effort is normal.   Abdominal:      General: There is no distension.      Palpations: Abdomen is soft. There is no mass.      Tenderness: There is no abdominal tenderness. There is no guarding or rebound.      Hernia: No hernia is present.   Genitourinary:     Comments: External female genitalia normal.  No evidence of mass or lesion.  Speculum semination reveals a grossly normal vagina and cervix.  There is no evidence of bleeding.  Bimanual examination reveals a mobile uterus without evidence of palpable adnexal mass.  No parametrial disease.  Musculoskeletal:         General: No swelling or tenderness.      Cervical back: Normal range of motion and neck supple.      Right lower leg: No edema.      Left lower leg: No edema.   Lymphadenopathy:      Cervical: No cervical adenopathy.   Skin:     General: Skin is warm and dry.      Coloration: Skin is not jaundiced or pale.      Findings: No lesion or rash.   Neurological:      General: No focal deficit present.      Mental Status: She is alert and oriented to person, place, and time. Mental status is at baseline.      Cranial Nerves: No cranial " nerve deficit.      Motor: No weakness.      Gait: Gait normal.   Psychiatric:         Mood and Affect: Mood normal.         Behavior: Behavior normal.         Thought Content: Thought content normal.         Judgment: Judgment normal.      Colposcopy    Date/Time: 2/6/2025 1:45 PM    Performed by: Trevor Blancas MD  Authorized by: Trevor Blancas MD    Verbal consent obtained?: Yes    Consent given by:  Patient  Patient states understanding of procedure being performed: Yes    Patient identity confirmed:  Verbally with patient  Pre-procedure:     Prepped with: acetic acid    Indication:     Indications: SUSY 2.  Procedure:     Procedure: Colposcopy of Vulva only    Post-procedure:     Findings comment:  No acetowhite epithelium    Impression comment:  No evidence of dysplasia or malignancy    Patient tolerance of procedure:  Tolerated well, no immediate complications        Labs: I have reviewed pertinent labs: CBC, CMP from 1/24/2025

## 2025-02-06 NOTE — ASSESSMENT & PLAN NOTE
44-year-old with a history of SUSY 2, lichen simplex chronicus, seborrheic keratosis.  Colposcopy of the vulva did not reveal any recurrent dysplasia or malignancy.  Last vulvar biopsy was 2/8/2024 and was consistent with SUSY 1.  Performance status is 0.  1.  Return in 6 months for vulvar colposcopy

## 2025-02-10 ENCOUNTER — HOSPITAL ENCOUNTER (OUTPATIENT)
Dept: ULTRASOUND IMAGING | Facility: HOSPITAL | Age: 45
Discharge: HOME | End: 2025-02-10
Attending: OBSTETRICS & GYNECOLOGY
Payer: COMMERCIAL

## 2025-02-10 DIAGNOSIS — D50.0 IRON DEFICIENCY ANEMIA DUE TO CHRONIC BLOOD LOSS: ICD-10-CM

## 2025-02-10 PROCEDURE — 76856 US EXAM PELVIC COMPLETE: CPT

## 2025-02-10 PROCEDURE — 76830 TRANSVAGINAL US NON-OB: CPT

## 2025-02-13 ENCOUNTER — TELEPHONE (OUTPATIENT)
Dept: GYNECOLOGIC ONCOLOGY | Facility: CLINIC | Age: 45
End: 2025-02-13

## 2025-02-13 ENCOUNTER — DOCUMENTATION (OUTPATIENT)
Age: 45
End: 2025-02-13

## 2025-02-13 NOTE — TELEPHONE ENCOUNTER
Called and spoke with patient about appointment.  Patient is moved to 4/3 at 2:15 pm in [facility] with Dr. Blancas and patient is on the wait list.  Patient will call as well to see if there are any cancellations.

## 2025-02-13 NOTE — PROGRESS NOTES
I reviewed the results of her transvaginal pelvic ultrasound with her.  She has a submucosal myoma as well as thickened endometrium.  Given that her colonoscopy recently was negative, this is a likely source of her chronic blood loss anemia.  She will come to the office to discuss treatment options which at this time would include hormone therapy, hysteroscopic resection, hysterectomy.

## 2025-02-13 NOTE — TELEPHONE ENCOUNTER
Called and spoke with patient about appointment with Dr. Blancas.  First available is 5/1 for a OVL.  Told patient that I will forward to Jesi to see if she can get in soon.  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 Informed patient that someone will call her back.

## 2025-02-14 ENCOUNTER — HOSPITAL ENCOUNTER (OUTPATIENT)
Dept: CT IMAGING | Facility: HOSPITAL | Age: 45
Discharge: HOME | End: 2025-02-14
Attending: OBSTETRICS & GYNECOLOGY
Payer: COMMERCIAL

## 2025-02-14 ENCOUNTER — APPOINTMENT (OUTPATIENT)
Dept: ULTRASOUND IMAGING | Facility: HOSPITAL | Age: 45
End: 2025-02-14
Attending: OBSTETRICS & GYNECOLOGY
Payer: COMMERCIAL

## 2025-02-14 DIAGNOSIS — D50.0 IRON DEFICIENCY ANEMIA DUE TO CHRONIC BLOOD LOSS: ICD-10-CM

## 2025-02-14 PROCEDURE — 74177 CT ABD & PELVIS W/CONTRAST: CPT

## 2025-02-14 RX ADMIN — IOHEXOL 75 ML: 350 INJECTION, SOLUTION INTRAVENOUS at 14:30

## 2025-02-17 ENCOUNTER — TELEPHONE (OUTPATIENT)
Age: 45
End: 2025-02-17

## 2025-02-17 NOTE — TELEPHONE ENCOUNTER
Pt asked if Dr. Blancas has anything sooner at  or [place] for her to be called. Pt is on the waitlist

## 2025-02-17 NOTE — TELEPHONE ENCOUNTER
Called and spoke to patient I have found a 30 min slot in [place] with provider. Patient took that appointment and would still like to be put on a cancellation list for a sooner appointment. I have placed patient on a cancellation list.

## 2025-02-19 ENCOUNTER — TELEPHONE (OUTPATIENT)
Age: 45
End: 2025-02-19

## 2025-02-19 NOTE — TELEPHONE ENCOUNTER
Patient is calling regarding her CT scan from 2/14/25, she was wondering if we would be able to call radiology and see if they could read the scan.  She was hoping to have the results for another appointment that she has tomorrow with Dr Ramana Hess @ [facility], she also needed the CT for that dr.  She requested the results be faxed to her at [phone].  Please call the patient with any updates.  I called [facility] but everyone was busy.

## 2025-02-19 NOTE — TELEPHONE ENCOUNTER
FYI:  Call outreach to [facility] spoke to Uriah requested CT scan from 2/14 be read and completed as pt req pt has another doctor appt sched for tomorrow.    Also outreach to pt to let her know Rad room made aware and req to complete reading was requested.    Advised pt that should the MD shayy not be able to view the scan she can call and we can fax the results to the MD.Pt understood and appreciative of the help today.

## 2025-02-20 ENCOUNTER — TELEPHONE (OUTPATIENT)
Dept: GYNECOLOGIC ONCOLOGY | Facility: CLINIC | Age: 45
End: 2025-02-20

## 2025-02-20 ENCOUNTER — PREP FOR PROCEDURE (OUTPATIENT)
Dept: GYNECOLOGIC ONCOLOGY | Facility: CLINIC | Age: 45
End: 2025-02-20

## 2025-02-20 DIAGNOSIS — N93.9 ABNORMAL UTERINE BLEEDING (AUB): Primary | ICD-10-CM

## 2025-02-20 RX ORDER — CLINDAMYCIN PHOSPHATE 900 MG/50ML
900 INJECTION, SOLUTION INTRAVENOUS ONCE
OUTPATIENT
Start: 2025-02-20 | End: 2025-02-20

## 2025-02-20 RX ORDER — SODIUM CHLORIDE, SODIUM LACTATE, POTASSIUM CHLORIDE, CALCIUM CHLORIDE 600; 310; 30; 20 MG/100ML; MG/100ML; MG/100ML; MG/100ML
125 INJECTION, SOLUTION INTRAVENOUS CONTINUOUS
OUTPATIENT
Start: 2025-02-20

## 2025-02-20 RX ORDER — HEPARIN SODIUM 5000 [USP'U]/ML
5000 INJECTION, SOLUTION INTRAVENOUS; SUBCUTANEOUS
OUTPATIENT
Start: 2025-02-20 | End: 2025-02-21

## 2025-02-20 RX ORDER — ACETAMINOPHEN 325 MG/1
975 TABLET ORAL ONCE
OUTPATIENT
Start: 2025-02-20 | End: 2025-02-20

## 2025-02-20 NOTE — TELEPHONE ENCOUNTER
Patient returned my call.  Hold placed for surgery on 4/1/2025 at [facility].

## 2025-02-20 NOTE — TELEPHONE ENCOUNTER
Phone patient, ady to return my call to discuss holding a date for her upcoming procedure.    Call back number provided.

## 2025-03-07 ENCOUNTER — TELEPHONE (OUTPATIENT)
Age: 45
End: 2025-03-07

## 2025-03-07 NOTE — TELEPHONE ENCOUNTER
"Received a phone call from patient.  Patient stated that she is \"tentatively\" scheduled for surgery on 4/1.  Patient is scheduled to see Dr. Blancas on 3/24.  Patient was seen by PCP and advised patient to reach out to us d/t low iron and possibly needing iron infusions prior to surgery.  Patient had labs done at [facility].  Patient stated that she will call PCP and ask them to fax all lab results to us.  Gave patient Right fax # [phone].  Please call patient with any updates.   "

## 2025-03-07 NOTE — TELEPHONE ENCOUNTER
See below. Labs are scanned in from January. She did not go for pre-op labs yet. Let me know if you want her to go thru surgical optimization?

## 2025-03-10 NOTE — TELEPHONE ENCOUNTER
Ok to take all doses noted below. Will need to hold inject 1 week prior to surgery. Additionally, message sent to Deedee to assist with surgical optimization appointment to discuss IV iron.

## 2025-03-10 NOTE — TELEPHONE ENCOUNTER
Spoke with patient who wanted to know if she is okay to start Zepbound (tirzepatide) weekly today for 3/10, 3/17 and 3/24 as prescribed by her endocrinologist. She states she put in fridge today and medication is only good for 21 days. She would also like to know if she needs IV iron prior to surgery and I informed her this will be discussed at her upcoming appointment.     [facility] callback number: [phone]

## 2025-03-10 NOTE — TELEPHONE ENCOUNTER
Can you assist with appointment for surgical optimization team to discuss IV iron, and let the patient know? Thanks!

## 2025-03-24 ENCOUNTER — OFFICE VISIT (OUTPATIENT)
Dept: GYNECOLOGIC ONCOLOGY | Facility: CLINIC | Age: 45
End: 2025-03-24
Payer: COMMERCIAL

## 2025-03-24 VITALS
SYSTOLIC BLOOD PRESSURE: 140 MMHG | RESPIRATION RATE: 18 BRPM | TEMPERATURE: 98.6 F | WEIGHT: 234.5 LBS | OXYGEN SATURATION: 97 % | BODY MASS INDEX: 36.81 KG/M2 | HEART RATE: 117 BPM | HEIGHT: 67 IN | DIASTOLIC BLOOD PRESSURE: 74 MMHG

## 2025-03-24 DIAGNOSIS — N93.9 ABNORMAL UTERINE BLEEDING (AUB): ICD-10-CM

## 2025-03-24 DIAGNOSIS — D50.0 IRON DEFICIENCY ANEMIA DUE TO CHRONIC BLOOD LOSS: Primary | ICD-10-CM

## 2025-03-24 PROBLEM — D25.0 SUBMUCOUS MYOMA OF UTERUS: Status: ACTIVE | Noted: 2025-03-24

## 2025-03-24 PROCEDURE — 99215 OFFICE O/P EST HI 40 MIN: CPT | Performed by: OBSTETRICS & GYNECOLOGY

## 2025-03-24 RX ORDER — TIRZEPATIDE 2.5 MG/.5ML
INJECTION, SOLUTION SUBCUTANEOUS
COMMUNITY
Start: 2025-03-07

## 2025-03-24 NOTE — ASSESSMENT & PLAN NOTE
44-year-old para 4 with 3 previous  sections, abnormal uterine bleeding, chronic blood loss anemia, submucous fibroid.  Her performance status is 0.  1.  I discussed multiple treatment options for fibroids including no treatment with ongoing iron supplementation, oral contraceptives, hysteroscopic resection, hysterectomy.  2.  After discussing the risks and benefits of the various treatment approaches, she is agreeable to definitive operative management with hysterectomy.  3.  I discussed the risks of examination under anesthesia, robotic assisted total laparoscopic hysterectomy, bilateral salpingectomy, possible exploratory laparotomy and all other indicated procedures.  She understands the risks of the operation including the potential additional risk of bladder injury due to 3 previous  sections and agrees to proceed as outlined.  Consent for surgery was obtained by me in the office.  4.  We discussed perioperative medication management, postoperative activity limitations.

## 2025-03-24 NOTE — H&P (VIEW-ONLY)
Name: Jacklyn De Santiago      : 1980      MRN: [id number]  Encounter Provider: Trevor Blancas MD  Encounter Date: 3/24/2025   [facility]  :  Assessment & Plan  Iron deficiency anemia due to chronic blood loss  Hemoglobin 9.7 g/dL 2025, low iron and ferritin likely secondary to chronic blood loss anemia from abnormal uterine bleeding       Abnormal uterine bleeding (AUB)  44-year-old para 4 with 3 previous  sections, abnormal uterine bleeding, chronic blood loss anemia, submucous fibroid.  Her performance status is 0.  1.  I discussed multiple treatment options for fibroids including no treatment with ongoing iron supplementation, oral contraceptives, hysteroscopic resection, hysterectomy.  2.  After discussing the risks and benefits of the various treatment approaches, she is agreeable to definitive operative management with hysterectomy.  3.  I discussed the risks of examination under anesthesia, robotic assisted total laparoscopic hysterectomy, bilateral salpingectomy, possible exploratory laparotomy and all other indicated procedures.  She understands the risks of the operation including the potential additional risk of bladder injury due to 3 previous  sections and agrees to proceed as outlined.  Consent for surgery was obtained by me in the office.  4.  We discussed perioperative medication management, postoperative activity limitations.               History of Present Illness   Reason for Visit / CC: Abnormal uterine bleeding, chronic blood loss anemia   Jacklyn De Santiago is a 44 y.o. female   Who returns for a treatment discussion.  She was diagnosed with iron deficiency anemia 2025 with a hemoglobin of 9.7 g/dL, low ferritin, low iron.  CMP performed at that time was normal.  She has a long history of heavy menstrual cycles which have been altering her quality of life particularly in the summer.  Pelvic ultrasound  2/10/2025 revealed the uterus to measure 10.6 x 5.7 cm with a 2.2 cm submucous myoma.  The endometrial thickness was 2.6 cm.  The right ovary was normal.  The left ovary was not seen.  She had a CT of the abdomen pelvis on 2025 which revealed a 3.4 cm indeterminant left adrenal nodule.  Prior imaging measured this at 3 cm.  She follows with endocrinology.  There is been no other interval change in medications or medical history since her last visit the office.  She has no complaints today.  She has not started on iron supplementation.  She is currently on Mounjaro.  Last dose will be more than 7 days prior to planned surgical procedure.      Pertinent Medical History          Review of Systems   Constitutional:  Negative for activity change and unexpected weight change.   HENT: Negative.     Eyes: Negative.    Respiratory: Negative.     Cardiovascular: Negative.    Gastrointestinal:  Negative for abdominal distention and abdominal pain.   Endocrine: Negative.    Genitourinary:  Positive for menstrual problem. Negative for pelvic pain and vaginal bleeding.   Musculoskeletal: Negative.    Skin: Negative.    Allergic/Immunologic: Negative.    Neurological: Negative.    Hematological: Negative.    Psychiatric/Behavioral: Negative.      A complete review of systems is negative other than that noted above in the HPI.  Past Medical History   No past medical history on file.  Past Surgical History:   Procedure Laterality Date     SECTION      3    DILATION AND EVACUATION      HERNIA REPAIR       Family History   Problem Relation Age of Onset    Breast cancer Mother     Melanoma Maternal Aunt     Colon cancer Paternal Grandfather       reports that she has never smoked. She has never used smokeless tobacco. She reports current alcohol use. She reports that she does not use drugs.  Current Outpatient Medications   Medication Instructions    acetaminophen (TYLENOL) 650 mg, Oral, As needed    clobetasol (TEMOVATE)  "0.05 % ointment PLEASE SEE ATTACHED FOR DETAILED DIRECTIONS    diphenhydrAMINE (BENADRYL) 25 mg, Oral, As needed    famotidine (PEPCID) 20 mg, 2 times daily    multivitamin (THERAGRAN) TABS 1 tablet, Daily    omeprazole (PRILOSEC) 20 mg, Daily    Zepbound 2.5 MG/0.5ML auto-injector INJECT 2.5 MILLIGRAMS SUBCUTANEOUS EVERY WEEK     Allergies   Allergen Reactions    Penicillin V Hives and Swelling    Penicillins Hives and Swelling         Objective   /74 (BP Location: Left arm, Patient Position: Sitting, Cuff Size: Large)   Pulse (!) 117   Temp 98.6 °F (37 °C) (Temporal)   Resp 18   Ht 5' 7\" (1.702 m)   Wt 106 kg (234 lb 8 oz)   SpO2 97%   BMI 36.73 kg/m²     Body mass index is 36.73 kg/m².  Pain Screening:  Pain Score: 0-No pain  ECOG   0  Physical Exam  Vitals reviewed.   Constitutional:       General: She is not in acute distress.     Appearance: Normal appearance. She is not ill-appearing.   HENT:      Head: Normocephalic and atraumatic.      Mouth/Throat:      Mouth: Mucous membranes are moist.   Eyes:      General: No scleral icterus.        Right eye: No discharge.         Left eye: No discharge.      Conjunctiva/sclera: Conjunctivae normal.   Cardiovascular:      Rate and Rhythm: Regular rhythm. Tachycardia present.      Heart sounds: Normal heart sounds.   Pulmonary:      Effort: Pulmonary effort is normal.      Breath sounds: Normal breath sounds.   Abdominal:      Palpations: Abdomen is soft.   Musculoskeletal:      Right lower leg: No edema.      Left lower leg: No edema.   Skin:     General: Skin is warm and dry.      Coloration: Skin is not jaundiced.      Findings: No rash.   Neurological:      General: No focal deficit present.      Mental Status: She is alert and oriented to person, place, and time.      Cranial Nerves: No cranial nerve deficit.      Motor: No weakness.      Gait: Gait normal.   Psychiatric:         Mood and Affect: Mood normal.         Behavior: Behavior normal.         " Thought Content: Thought content normal.         Judgment: Judgment normal.          Labs: I have reviewed pertinent labs.  CBC, iron panel, CMP      Radiology Results Review: I personally reviewed the following image studies in PACS and associated radiology reports: CT abdomen/pelvis and Ultrasound(s). My interpretation of the radiology images/reports is: 2.2 cm submucous myoma.  No evidence of lymphadenopathy, ascites, peritoneal disease.      Administrative Statements   I have spent a total time of 45 minutes in caring for this patient on the day of the visit/encounter including Diagnostic results, Prognosis, Risks and benefits of tx options, Instructions for management, Patient and family education, Importance of tx compliance, Risk factor reductions, Impressions, Counseling / Coordination of care, Documenting in the medical record, Reviewing/placing orders in the medical record (including tests, medications, and/or procedures), and Obtaining or reviewing history  .

## 2025-03-24 NOTE — PROGRESS NOTES
Name: Jacklyn De Santiago      : 1980      MRN: [id number]  Encounter Provider: Trevor Blancas MD  Encounter Date: 3/24/2025   [facility]  :  Assessment & Plan  Iron deficiency anemia due to chronic blood loss  Hemoglobin 9.7 g/dL 2025, low iron and ferritin likely secondary to chronic blood loss anemia from abnormal uterine bleeding       Abnormal uterine bleeding (AUB)  44-year-old para 4 with 3 previous  sections, abnormal uterine bleeding, chronic blood loss anemia, submucous fibroid.  Her performance status is 0.  1.  I discussed multiple treatment options for fibroids including no treatment with ongoing iron supplementation, oral contraceptives, hysteroscopic resection, hysterectomy.  2.  After discussing the risks and benefits of the various treatment approaches, she is agreeable to definitive operative management with hysterectomy.  3.  I discussed the risks of examination under anesthesia, robotic assisted total laparoscopic hysterectomy, bilateral salpingectomy, possible exploratory laparotomy and all other indicated procedures.  She understands the risks of the operation including the potential additional risk of bladder injury due to 3 previous  sections and agrees to proceed as outlined.  Consent for surgery was obtained by me in the office.  4.  We discussed perioperative medication management, postoperative activity limitations.               History of Present Illness   Reason for Visit / CC: Abnormal uterine bleeding, chronic blood loss anemia   Jacklyn De Santiago is a 44 y.o. female   Who returns for a treatment discussion.  She was diagnosed with iron deficiency anemia 2025 with a hemoglobin of 9.7 g/dL, low ferritin, low iron.  CMP performed at that time was normal.  She has a long history of heavy menstrual cycles which have been altering her quality of life particularly in the summer.  Pelvic ultrasound  2/10/2025 revealed the uterus to measure 10.6 x 5.7 cm with a 2.2 cm submucous myoma.  The endometrial thickness was 2.6 cm.  The right ovary was normal.  The left ovary was not seen.  She had a CT of the abdomen pelvis on 2025 which revealed a 3.4 cm indeterminant left adrenal nodule.  Prior imaging measured this at 3 cm.  She follows with endocrinology.  There is been no other interval change in medications or medical history since her last visit the office.  She has no complaints today.  She has not started on iron supplementation.  She is currently on Mounjaro.  Last dose will be more than 7 days prior to planned surgical procedure.      Pertinent Medical History          Review of Systems   Constitutional:  Negative for activity change and unexpected weight change.   HENT: Negative.     Eyes: Negative.    Respiratory: Negative.     Cardiovascular: Negative.    Gastrointestinal:  Negative for abdominal distention and abdominal pain.   Endocrine: Negative.    Genitourinary:  Positive for menstrual problem. Negative for pelvic pain and vaginal bleeding.   Musculoskeletal: Negative.    Skin: Negative.    Allergic/Immunologic: Negative.    Neurological: Negative.    Hematological: Negative.    Psychiatric/Behavioral: Negative.      A complete review of systems is negative other than that noted above in the HPI.  Past Medical History   No past medical history on file.  Past Surgical History:   Procedure Laterality Date     SECTION      3    DILATION AND EVACUATION      HERNIA REPAIR       Family History   Problem Relation Age of Onset    Breast cancer Mother     Melanoma Maternal Aunt     Colon cancer Paternal Grandfather       reports that she has never smoked. She has never used smokeless tobacco. She reports current alcohol use. She reports that she does not use drugs.  Current Outpatient Medications   Medication Instructions    acetaminophen (TYLENOL) 650 mg, Oral, As needed    clobetasol (TEMOVATE)  "0.05 % ointment PLEASE SEE ATTACHED FOR DETAILED DIRECTIONS    diphenhydrAMINE (BENADRYL) 25 mg, Oral, As needed    famotidine (PEPCID) 20 mg, 2 times daily    multivitamin (THERAGRAN) TABS 1 tablet, Daily    omeprazole (PRILOSEC) 20 mg, Daily    Zepbound 2.5 MG/0.5ML auto-injector INJECT 2.5 MILLIGRAMS SUBCUTANEOUS EVERY WEEK     Allergies   Allergen Reactions    Penicillin V Hives and Swelling    Penicillins Hives and Swelling         Objective   /74 (BP Location: Left arm, Patient Position: Sitting, Cuff Size: Large)   Pulse (!) 117   Temp 98.6 °F (37 °C) (Temporal)   Resp 18   Ht 5' 7\" (1.702 m)   Wt 106 kg (234 lb 8 oz)   SpO2 97%   BMI 36.73 kg/m²     Body mass index is 36.73 kg/m².  Pain Screening:  Pain Score: 0-No pain  ECOG   0  Physical Exam  Vitals reviewed.   Constitutional:       General: She is not in acute distress.     Appearance: Normal appearance. She is not ill-appearing.   HENT:      Head: Normocephalic and atraumatic.      Mouth/Throat:      Mouth: Mucous membranes are moist.   Eyes:      General: No scleral icterus.        Right eye: No discharge.         Left eye: No discharge.      Conjunctiva/sclera: Conjunctivae normal.   Cardiovascular:      Rate and Rhythm: Regular rhythm. Tachycardia present.      Heart sounds: Normal heart sounds.   Pulmonary:      Effort: Pulmonary effort is normal.      Breath sounds: Normal breath sounds.   Abdominal:      Palpations: Abdomen is soft.   Musculoskeletal:      Right lower leg: No edema.      Left lower leg: No edema.   Skin:     General: Skin is warm and dry.      Coloration: Skin is not jaundiced.      Findings: No rash.   Neurological:      General: No focal deficit present.      Mental Status: She is alert and oriented to person, place, and time.      Cranial Nerves: No cranial nerve deficit.      Motor: No weakness.      Gait: Gait normal.   Psychiatric:         Mood and Affect: Mood normal.         Behavior: Behavior normal.         " Thought Content: Thought content normal.         Judgment: Judgment normal.          Labs: I have reviewed pertinent labs.  CBC, iron panel, CMP      Radiology Results Review: I personally reviewed the following image studies in PACS and associated radiology reports: CT abdomen/pelvis and Ultrasound(s). My interpretation of the radiology images/reports is: 2.2 cm submucous myoma.  No evidence of lymphadenopathy, ascites, peritoneal disease.      Administrative Statements   I have spent a total time of 45 minutes in caring for this patient on the day of the visit/encounter including Diagnostic results, Prognosis, Risks and benefits of tx options, Instructions for management, Patient and family education, Importance of tx compliance, Risk factor reductions, Impressions, Counseling / Coordination of care, Documenting in the medical record, Reviewing/placing orders in the medical record (including tests, medications, and/or procedures), and Obtaining or reviewing history  .

## 2025-03-24 NOTE — ASSESSMENT & PLAN NOTE
Hemoglobin 9.7 g/dL 1/24/2025, low iron and ferritin likely secondary to chronic blood loss anemia from abnormal uterine bleeding

## 2025-03-25 ENCOUNTER — TELEPHONE (OUTPATIENT)
Dept: GYNECOLOGIC ONCOLOGY | Facility: CLINIC | Age: 45
End: 2025-03-25

## 2025-03-25 NOTE — TELEPHONE ENCOUNTER
Called patient back and went over the pre-admission testing. Patient stated she understood all instructions and will head to  campus today to get these completed.

## 2025-03-25 NOTE — TELEPHONE ENCOUNTER
Patient would to go over her instructions for surgery with Dr. Blancas. Patient spoke with Edwina and was given instructions and would like a call back to go over what needs to be completed besides her blood work. Please call patient back at [phone]

## 2025-03-25 NOTE — TELEPHONE ENCOUNTER
Called patient to discuss surgery scheduling, surgery scheduled for 04/01/2025 with Dr. Blancas  at [facility]  Pre Op and Post op instructions reviewed.  Post op appointment will be scheduled with Dr. Blancas.  All questions/concerns addressed.  Provided patient with call back number for any questions/concerns.

## 2025-03-26 ENCOUNTER — APPOINTMENT (OUTPATIENT)
Dept: LAB | Facility: HOSPITAL | Age: 45
End: 2025-03-26
Payer: COMMERCIAL

## 2025-03-26 ENCOUNTER — LAB REQUISITION (OUTPATIENT)
Dept: LAB | Facility: HOSPITAL | Age: 45
End: 2025-03-26
Payer: COMMERCIAL

## 2025-03-26 ENCOUNTER — APPOINTMENT (OUTPATIENT)
Dept: LAB | Facility: HOSPITAL | Age: 45
End: 2025-03-26
Attending: OBSTETRICS & GYNECOLOGY
Payer: COMMERCIAL

## 2025-03-26 ENCOUNTER — HOSPITAL ENCOUNTER (OUTPATIENT)
Dept: RADIOLOGY | Facility: HOSPITAL | Age: 45
Discharge: HOME | End: 2025-03-26
Payer: COMMERCIAL

## 2025-03-26 DIAGNOSIS — N93.9 ABNORMAL UTERINE BLEEDING (AUB): ICD-10-CM

## 2025-03-26 DIAGNOSIS — N93.9 ABNORMAL UTERINE AND VAGINAL BLEEDING, UNSPECIFIED: ICD-10-CM

## 2025-03-26 LAB
ABO GROUP BLD: NORMAL
ANION GAP SERPL CALCULATED.3IONS-SCNC: 7 MMOL/L (ref 4–13)
BLD GP AB SCN SERPL QL: NEGATIVE
BUN SERPL-MCNC: 12 MG/DL (ref 5–25)
CALCIUM SERPL-MCNC: 8.8 MG/DL (ref 8.4–10.2)
CHLORIDE SERPL-SCNC: 105 MMOL/L (ref 96–108)
CO2 SERPL-SCNC: 27 MMOL/L (ref 21–32)
CREAT SERPL-MCNC: 0.7 MG/DL (ref 0.6–1.3)
ERYTHROCYTE [DISTWIDTH] IN BLOOD BY AUTOMATED COUNT: 17.5 % (ref 11.6–15.1)
EST. AVERAGE GLUCOSE BLD GHB EST-MCNC: 120 MG/DL
GFR SERPL CREATININE-BSD FRML MDRD: 104 ML/MIN/1.73SQ M
GLUCOSE P FAST SERPL-MCNC: 88 MG/DL (ref 65–99)
HBA1C MFR BLD: 5.8 %
HCT VFR BLD AUTO: 33.4 % (ref 34.8–46.1)
HGB BLD-MCNC: 9.5 G/DL (ref 11.5–15.4)
MCH RBC QN AUTO: 21.2 PG (ref 26.8–34.3)
MCHC RBC AUTO-ENTMCNC: 28.4 G/DL (ref 31.4–37.4)
MCV RBC AUTO: 74 FL (ref 82–98)
PLATELET # BLD AUTO: 400 THOUSANDS/UL (ref 149–390)
PMV BLD AUTO: 9 FL (ref 8.9–12.7)
POTASSIUM SERPL-SCNC: 4.2 MMOL/L (ref 3.5–5.3)
RBC # BLD AUTO: 4.49 MILLION/UL (ref 3.81–5.12)
RH BLD: POSITIVE
SODIUM SERPL-SCNC: 139 MMOL/L (ref 135–147)
SPECIMEN EXPIRATION DATE: NORMAL
WBC # BLD AUTO: 7.23 THOUSAND/UL (ref 4.31–10.16)

## 2025-03-26 PROCEDURE — 83036 HEMOGLOBIN GLYCOSYLATED A1C: CPT

## 2025-03-26 PROCEDURE — 85027 COMPLETE CBC AUTOMATED: CPT

## 2025-03-26 PROCEDURE — 86900 BLOOD TYPING SEROLOGIC ABO: CPT | Performed by: OBSTETRICS & GYNECOLOGY

## 2025-03-26 PROCEDURE — 36415 COLL VENOUS BLD VENIPUNCTURE: CPT

## 2025-03-26 PROCEDURE — 80048 BASIC METABOLIC PNL TOTAL CA: CPT

## 2025-03-26 PROCEDURE — 86850 RBC ANTIBODY SCREEN: CPT | Performed by: OBSTETRICS & GYNECOLOGY

## 2025-03-26 PROCEDURE — 71046 X-RAY EXAM CHEST 2 VIEWS: CPT

## 2025-03-26 PROCEDURE — 86901 BLOOD TYPING SEROLOGIC RH(D): CPT | Performed by: OBSTETRICS & GYNECOLOGY

## 2025-03-26 NOTE — PRE-PROCEDURE INSTRUCTIONS
Pre-Surgery Instructions:   Medication Instructions    acetaminophen (TYLENOL) 650 mg CR tablet Uses PRN- OK to take day of surgery    diphenhydrAMINE (BENADRYL) 25 mg capsule Uses PRN- OK to take day of surgery    famotidine (PEPCID) 10 mg tablet Take day of surgery.    omeprazole (PriLOSEC) 20 mg delayed release capsule Uses PRN- OK to take day of surgery    Zepbound 2.5 MG/0.5ML auto-injector Stop taking 7 days prior to surgery.      Medication instructions for day of surgery reviewed. Please take all instructed medications with only a sip of water.       You will receive a call one business day prior to surgery with an arrival time and hospital directions. If your surgery is scheduled on a Monday, the hospital will be calling you on the Friday prior to your surgery. If you have not heard from anyone by 8pm, please call the hospital supervisor through the hospital  at [phone]. ([place] [phone] or [place] [phone]).    Do not eat or drink anything after midnight the night before your surgery, including candy, mints, lifesavers, or chewing gum. Do not drink alcohol 24hrs before your surgery. Try not to smoke at least 24hrs before your surgery.       Follow the pre surgery showering instructions as listed in the “My Surgical Experience Booklet” or otherwise provided by your surgeon's office. Do not use a blade to shave the surgical area 1 week before surgery. It is okay to use a clean electric clippers up to 24 hours before surgery. Do not apply any lotions, creams, including makeup, cologne, deodorant, or perfumes after showering on the day of your surgery. Do not use dry shampoo, hair spray, hair gel, or any type of hair products.     No contact lenses, eye make-up, or artificial eyelashes. Remove nail polish, including gel polish, and any artificial, gel, or acrylic nails if possible. Remove all jewelry including rings and body piercing jewelry.     Wear causal clothing that is easy to  take on and off. Consider your type of surgery.    Keep any valuables, jewelry, piercings at home. Please bring any specially ordered equipment (sling, braces) if indicated.    Arrange for a responsible person to drive you to and from the hospital on the day of your surgery. Please confirm the visitor policy for the day of your procedure when you receive your phone call with an arrival time.     Call the surgeon's office with any new illnesses, exposures, or additional questions prior to surgery.    Please reference your “My Surgical Experience Booklet” for additional information to prepare for your upcoming surgery.

## 2025-03-31 ENCOUNTER — ANESTHESIA EVENT (OUTPATIENT)
Dept: PERIOP | Facility: HOSPITAL | Age: 45
End: 2025-03-31
Payer: COMMERCIAL

## 2025-04-01 ENCOUNTER — HOSPITAL ENCOUNTER (OUTPATIENT)
Facility: HOSPITAL | Age: 45
Setting detail: OUTPATIENT SURGERY
Discharge: HOME | End: 2025-04-01
Attending: OBSTETRICS & GYNECOLOGY | Admitting: OBSTETRICS & GYNECOLOGY
Payer: COMMERCIAL

## 2025-04-01 ENCOUNTER — ANESTHESIA (OUTPATIENT)
Dept: PERIOP | Facility: HOSPITAL | Age: 45
End: 2025-04-01
Payer: COMMERCIAL

## 2025-04-01 VITALS
HEIGHT: 67 IN | OXYGEN SATURATION: 99 % | TEMPERATURE: 98 F | SYSTOLIC BLOOD PRESSURE: 131 MMHG | RESPIRATION RATE: 16 BRPM | DIASTOLIC BLOOD PRESSURE: 81 MMHG | BODY MASS INDEX: 36.1 KG/M2 | WEIGHT: 230 LBS | HEART RATE: 79 BPM

## 2025-04-01 DIAGNOSIS — Z90.710 S/P LAPAROSCOPIC HYSTERECTOMY: Primary | ICD-10-CM

## 2025-04-01 DIAGNOSIS — N93.9 ABNORMAL UTERINE BLEEDING (AUB): ICD-10-CM

## 2025-04-01 LAB
ABO GROUP BLD: NORMAL
BLD GP AB SCN SERPL QL: NEGATIVE
EXT PREGNANCY TEST URINE: NEGATIVE
EXT. CONTROL: NORMAL
RH BLD: POSITIVE
SPECIMEN EXPIRATION DATE: NORMAL

## 2025-04-01 PROCEDURE — NC001 PR NO CHARGE

## 2025-04-01 PROCEDURE — 58571 TLH W/T/O 250 G OR LESS: CPT | Performed by: OBSTETRICS & GYNECOLOGY

## 2025-04-01 PROCEDURE — 86900 BLOOD TYPING SEROLOGIC ABO: CPT | Performed by: OBSTETRICS & GYNECOLOGY

## 2025-04-01 PROCEDURE — 86901 BLOOD TYPING SEROLOGIC RH(D): CPT | Performed by: OBSTETRICS & GYNECOLOGY

## 2025-04-01 PROCEDURE — 88307 TISSUE EXAM BY PATHOLOGIST: CPT | Performed by: STUDENT IN AN ORGANIZED HEALTH CARE EDUCATION/TRAINING PROGRAM

## 2025-04-01 PROCEDURE — S2900 ROBOTIC SURGICAL SYSTEM: HCPCS | Performed by: OBSTETRICS & GYNECOLOGY

## 2025-04-01 PROCEDURE — 86850 RBC ANTIBODY SCREEN: CPT | Performed by: OBSTETRICS & GYNECOLOGY

## 2025-04-01 PROCEDURE — 81025 URINE PREGNANCY TEST: CPT | Performed by: OBSTETRICS & GYNECOLOGY

## 2025-04-01 RX ORDER — ACETAMINOPHEN 325 MG/1
975 TABLET ORAL EVERY 6 HOURS PRN
Status: DISCONTINUED | OUTPATIENT
Start: 2025-04-01 | End: 2025-04-01 | Stop reason: HOSPADM

## 2025-04-01 RX ORDER — DEXAMETHASONE SODIUM PHOSPHATE 10 MG/ML
INJECTION, SOLUTION INTRAMUSCULAR; INTRAVENOUS AS NEEDED
Status: DISCONTINUED | OUTPATIENT
Start: 2025-04-01 | End: 2025-04-01

## 2025-04-01 RX ORDER — CLINDAMYCIN PHOSPHATE 900 MG/50ML
900 INJECTION, SOLUTION INTRAVENOUS ONCE
Status: COMPLETED | OUTPATIENT
Start: 2025-04-01 | End: 2025-04-01

## 2025-04-01 RX ORDER — HYDROMORPHONE HCL IN WATER/PF 6 MG/30 ML
0.2 PATIENT CONTROLLED ANALGESIA SYRINGE INTRAVENOUS
Status: DISCONTINUED | OUTPATIENT
Start: 2025-04-01 | End: 2025-04-01 | Stop reason: HOSPADM

## 2025-04-01 RX ORDER — SODIUM CHLORIDE, SODIUM LACTATE, POTASSIUM CHLORIDE, CALCIUM CHLORIDE 600; 310; 30; 20 MG/100ML; MG/100ML; MG/100ML; MG/100ML
100 INJECTION, SOLUTION INTRAVENOUS CONTINUOUS
Status: DISCONTINUED | OUTPATIENT
Start: 2025-04-01 | End: 2025-04-01 | Stop reason: HOSPADM

## 2025-04-01 RX ORDER — KETOROLAC TROMETHAMINE 30 MG/ML
INJECTION, SOLUTION INTRAMUSCULAR; INTRAVENOUS AS NEEDED
Status: DISCONTINUED | OUTPATIENT
Start: 2025-04-01 | End: 2025-04-01

## 2025-04-01 RX ORDER — BUPIVACAINE HYDROCHLORIDE 2.5 MG/ML
INJECTION, SOLUTION EPIDURAL; INFILTRATION; INTRACAUDAL; PERINEURAL AS NEEDED
Status: DISCONTINUED | OUTPATIENT
Start: 2025-04-01 | End: 2025-04-01 | Stop reason: HOSPADM

## 2025-04-01 RX ORDER — ROCURONIUM BROMIDE 10 MG/ML
INJECTION, SOLUTION INTRAVENOUS AS NEEDED
Status: DISCONTINUED | OUTPATIENT
Start: 2025-04-01 | End: 2025-04-01

## 2025-04-01 RX ORDER — ONDANSETRON 2 MG/ML
4 INJECTION INTRAMUSCULAR; INTRAVENOUS ONCE AS NEEDED
Status: DISCONTINUED | OUTPATIENT
Start: 2025-04-01 | End: 2025-04-01 | Stop reason: HOSPADM

## 2025-04-01 RX ORDER — OXYCODONE HYDROCHLORIDE 5 MG/1
5 TABLET ORAL EVERY 6 HOURS PRN
Qty: 5 TABLET | Refills: 0 | Status: SHIPPED | OUTPATIENT
Start: 2025-04-01 | End: 2025-04-11

## 2025-04-01 RX ORDER — PROPOFOL 10 MG/ML
INJECTION, EMULSION INTRAVENOUS AS NEEDED
Status: DISCONTINUED | OUTPATIENT
Start: 2025-04-01 | End: 2025-04-01

## 2025-04-01 RX ORDER — HYDROMORPHONE HYDROCHLORIDE 2 MG/ML
INJECTION, SOLUTION INTRAMUSCULAR; INTRAVENOUS; SUBCUTANEOUS AS NEEDED
Status: DISCONTINUED | OUTPATIENT
Start: 2025-04-01 | End: 2025-04-01

## 2025-04-01 RX ORDER — OXYCODONE HYDROCHLORIDE 10 MG/1
10 TABLET ORAL EVERY 4 HOURS PRN
Status: DISCONTINUED | OUTPATIENT
Start: 2025-04-01 | End: 2025-04-01 | Stop reason: HOSPADM

## 2025-04-01 RX ORDER — HEPARIN SODIUM 5000 [USP'U]/ML
5000 INJECTION, SOLUTION INTRAVENOUS; SUBCUTANEOUS
Status: COMPLETED | OUTPATIENT
Start: 2025-04-01 | End: 2025-04-01

## 2025-04-01 RX ORDER — FENTANYL CITRATE/PF 50 MCG/ML
25 SYRINGE (ML) INJECTION
Status: DISCONTINUED | OUTPATIENT
Start: 2025-04-01 | End: 2025-04-01 | Stop reason: HOSPADM

## 2025-04-01 RX ORDER — FENTANYL CITRATE 50 UG/ML
INJECTION, SOLUTION INTRAMUSCULAR; INTRAVENOUS AS NEEDED
Status: DISCONTINUED | OUTPATIENT
Start: 2025-04-01 | End: 2025-04-01

## 2025-04-01 RX ORDER — MAGNESIUM HYDROXIDE 1200 MG/15ML
LIQUID ORAL AS NEEDED
Status: DISCONTINUED | OUTPATIENT
Start: 2025-04-01 | End: 2025-04-01 | Stop reason: HOSPADM

## 2025-04-01 RX ORDER — OXYCODONE HYDROCHLORIDE 5 MG/1
5 TABLET ORAL EVERY 4 HOURS PRN
Status: DISCONTINUED | OUTPATIENT
Start: 2025-04-01 | End: 2025-04-01 | Stop reason: HOSPADM

## 2025-04-01 RX ORDER — SODIUM CHLORIDE, SODIUM LACTATE, POTASSIUM CHLORIDE, CALCIUM CHLORIDE 600; 310; 30; 20 MG/100ML; MG/100ML; MG/100ML; MG/100ML
125 INJECTION, SOLUTION INTRAVENOUS CONTINUOUS
Status: DISCONTINUED | OUTPATIENT
Start: 2025-04-01 | End: 2025-04-01 | Stop reason: HOSPADM

## 2025-04-01 RX ORDER — ACETAMINOPHEN 325 MG/1
975 TABLET ORAL ONCE
Status: COMPLETED | OUTPATIENT
Start: 2025-04-01 | End: 2025-04-01

## 2025-04-01 RX ORDER — LIDOCAINE HYDROCHLORIDE 10 MG/ML
INJECTION, SOLUTION EPIDURAL; INFILTRATION; INTRACAUDAL; PERINEURAL AS NEEDED
Status: DISCONTINUED | OUTPATIENT
Start: 2025-04-01 | End: 2025-04-01

## 2025-04-01 RX ORDER — IBUPROFEN 400 MG/1
600 TABLET, FILM COATED ORAL EVERY 6 HOURS PRN
Status: DISCONTINUED | OUTPATIENT
Start: 2025-04-01 | End: 2025-04-01 | Stop reason: HOSPADM

## 2025-04-01 RX ORDER — SODIUM CHLORIDE 9 MG/ML
INJECTION, SOLUTION INTRAVENOUS CONTINUOUS PRN
Status: DISCONTINUED | OUTPATIENT
Start: 2025-04-01 | End: 2025-04-01

## 2025-04-01 RX ORDER — MIDAZOLAM HYDROCHLORIDE 2 MG/2ML
INJECTION, SOLUTION INTRAMUSCULAR; INTRAVENOUS AS NEEDED
Status: DISCONTINUED | OUTPATIENT
Start: 2025-04-01 | End: 2025-04-01

## 2025-04-01 RX ORDER — ONDANSETRON 2 MG/ML
INJECTION INTRAMUSCULAR; INTRAVENOUS AS NEEDED
Status: DISCONTINUED | OUTPATIENT
Start: 2025-04-01 | End: 2025-04-01

## 2025-04-01 RX ADMIN — MIDAZOLAM 2 MG: 1 INJECTION INTRAMUSCULAR; INTRAVENOUS at 10:33

## 2025-04-01 RX ADMIN — SODIUM CHLORIDE, SODIUM LACTATE, POTASSIUM CHLORIDE, AND CALCIUM CHLORIDE 125 ML/HR: .6; .31; .03; .02 INJECTION, SOLUTION INTRAVENOUS at 10:19

## 2025-04-01 RX ADMIN — FENTANYL CITRATE 50 MCG: 50 INJECTION INTRAMUSCULAR; INTRAVENOUS at 11:00

## 2025-04-01 RX ADMIN — FENTANYL CITRATE 50 MCG: 50 INJECTION INTRAMUSCULAR; INTRAVENOUS at 10:37

## 2025-04-01 RX ADMIN — CLINDAMYCIN PHOSPHATE 900 MG: 900 INJECTION, SOLUTION INTRAVENOUS at 10:40

## 2025-04-01 RX ADMIN — OXYCODONE HYDROCHLORIDE 5 MG: 5 TABLET ORAL at 14:25

## 2025-04-01 RX ADMIN — ONDANSETRON 4 MG: 2 INJECTION INTRAMUSCULAR; INTRAVENOUS at 11:36

## 2025-04-01 RX ADMIN — GENTAMICIN SULFATE 124 MG: 40 INJECTION, SOLUTION INTRAMUSCULAR; INTRAVENOUS at 10:41

## 2025-04-01 RX ADMIN — SODIUM CHLORIDE: 0.9 INJECTION, SOLUTION INTRAVENOUS at 10:41

## 2025-04-01 RX ADMIN — ACETAMINOPHEN 975 MG: 325 TABLET, FILM COATED ORAL at 10:19

## 2025-04-01 RX ADMIN — PROPOFOL 200 MG: 10 INJECTION, EMULSION INTRAVENOUS at 10:37

## 2025-04-01 RX ADMIN — HYDROMORPHONE HYDROCHLORIDE 1 MG: 2 INJECTION, SOLUTION INTRAMUSCULAR; INTRAVENOUS; SUBCUTANEOUS at 11:16

## 2025-04-01 RX ADMIN — DEXAMETHASONE SODIUM PHOSPHATE 10 MG: 10 INJECTION, SOLUTION INTRAMUSCULAR; INTRAVENOUS at 11:00

## 2025-04-01 RX ADMIN — DEXMEDETOMIDINE HYDROCHLORIDE 12 MCG: 100 INJECTION, SOLUTION INTRAVENOUS at 12:02

## 2025-04-01 RX ADMIN — ROCURONIUM BROMIDE 80 MG: 10 INJECTION, SOLUTION INTRAVENOUS at 10:37

## 2025-04-01 RX ADMIN — SUGAMMADEX 200 MG: 100 INJECTION, SOLUTION INTRAVENOUS at 12:12

## 2025-04-01 RX ADMIN — KETOROLAC TROMETHAMINE 15 MG: 30 INJECTION, SOLUTION INTRAMUSCULAR; INTRAVENOUS at 12:00

## 2025-04-01 RX ADMIN — LIDOCAINE HYDROCHLORIDE 50 MG: 10 INJECTION, SOLUTION EPIDURAL; INFILTRATION; INTRACAUDAL; PERINEURAL at 10:37

## 2025-04-01 RX ADMIN — HEPARIN SODIUM 5000 UNITS: 5000 INJECTION INTRAVENOUS; SUBCUTANEOUS at 10:19

## 2025-04-01 RX ADMIN — ACETAMINOPHEN 975 MG: 325 TABLET, FILM COATED ORAL at 14:22

## 2025-04-01 NOTE — OP NOTE
OPERATIVE REPORT  PATIENT NAME: Jacklyn De Santiago    :  1980  MRN: [id number]  Pt Location: [facility] OR ROOM 14    SURGERY DATE: 2025    Surgeons and Role:     * Trevor Blancas MD - Primary     * Vivi Chin PA-C - Assisting     * Gilda Umanzor MD - Assisting    Preop Diagnosis:  Abnormal uterine bleeding (AUB) [N93.9]    Post-Op Diagnosis Codes:     * Abnormal uterine bleeding (AUB) [N93.9]    Procedure(s):  ROBOTIC ASSISTED TOTAL LAPAROSCOPIC HYSTERECTOMY. BILATERAL SALPINGECTOMY. EXAMINATION UNDER ANESTHESIA  CYSTOSCOPY    Specimen(s):  ID Type Source Tests Collected by Time Destination   1 : with cervix and bilateral fallopian tubes Tissue Uterus TISSUE EXAM Trevor Blancas MD 2025 11:39 AM        Estimated Blood Loss:   Minimal    Drains:  [REMOVED] Urethral Catheter Double-lumen;Non-latex 16 Fr. (Removed)   Number of days: 0       Anesthesia Type:   General    Operative Indications:  Abnormal uterine bleeding (AUB) [N93.9]  44-year-old with abnormal uterine bleeding, submucous myoma, chronic blood loss anemia with preoperative hemoglobin 9.5 g/dL, 3 previous  sections presents for definitive operative management.    Operative Findings:  1.  Exam under anesthesia revealed a gross normal cervix.  The uterus was mobile.  There were no palpable adnexal masses.  2.  On laparoscopy, there is no evidence of peritoneal disease.  In the pelvis, there were adhesions present between the bladder and the uterus.  There was a small omental adhesion to the anterior abdominal wall.  There was a benign appearing left ovarian cyst that appeared to be a corpus luteum.  No other evidence of peritoneal disease.  3.  Cystoscopy revealed bilateral jets and no evidence of bladder injury.   This patient was part of a randomized clinical trial. She was randomized to Control (>12).     The assistant port was size 5mm. A mini laparotomy was not performed.     The starting pneumoperitoneum was  15 mmHg. We were able to complete the surgery at this pneumoperitoneum.          Complications:   None    Procedure and Technique:  After informed consent was obtained, the patient was taken to the operating room where general endotracheal anesthesia was administered without incident.  She was then prepped and draped in normal sterile fashion in the low dorsolithotomy position.  Examination under anesthesia was then performed with findings noted as above.  A speculum was placed in the patient's vagina.  The anterior lip of the cervix was grasped with single-tooth tenaculum.  The uterus was sounded to 10 cm.  The cervix was dilated with Ca dilators.  A 0 Vicryl stay suture was placed on the cervix and used to secure the Mildred uterine manipulator and Arnaldo cup.  A Leach catheter was inserted.  Attention was then turned to the abdomen.  0.25% Marcaine was used to infiltrate the skin prior to placement of any trocar.  The first insertion site was approximately 2 cm superior to the umbilicus in the midline.  An 8 mm skin incision was made using 11 blade scalpel.  Through this was passed an 8 mm robotic trocar under direct visualization into the abdominal cavity.  The abdomen was then insufflated to 15 mmHg using CO2 gas.  Findings on laparoscopy are noted as above.  Additional trocars then placed under direct visualization.  There were two 8 mm robotic trocars placed on left side of the abdomen and 1 on the right side of the abdomen.  A 5 mm assistant port was then placed in the right upper quadrant.  The robot was docked.  Attention was first turned to the right side of the pelvis.  The fallopian tube was grasped and elevated.  The mesosalpinx was cauterized and transected.  The round ligament on the right side was cauterized and transected.  The retroperitoneal space was opened.  The ureter was identified transperitoneally.  A window was made in the broad ligament above the ureter and below the utero-ovarian ligament.   The utero-ovarian ligament on the right side was then cauterized and transected.  Attention was then turned to the left side.  The left fallopian tube was elevated.  The mesosalpinx was cauterized and transected.  The left round ligament was then cauterized and transected.  Adhesions from the bladder to the uterus were then taken down.  The bladder was noted to be densely adherent to the cervix.  The adhesions were taken down using cautery and sharp dissection.  Once adequate plane was identified, the bladder was able to be taken down below the level of the external os of the cervix as marked by the Koh cup.  Adhesions to the left side were also taken down adequately.  A window was made in the broad ligament superior to the ureter on the left side at the level of the utero-ovarian ligament.  The utero-ovarian ligament was then cauterized and transected.  The left uterine vasculature was then cauterized and transected.  The left cardinal ligament was cauterized and transected.  Tension was then turned to the right side.  The right uterine vessels were cauterized and transected.  The cardinal ligaments were cauterized and transected.  A circumferential colpotomy was performed using monopolar cautery.  The uterus, cervix, bilateral fallopian tubes were then removed transvaginally.  A 2-0 STRATAFIX suture was then advanced into the pelvis transvaginally and used to close the vagina in a running fashion.  Hemostasis was noted to be adequate.  There was a small amount of bleeding identified at the junction of the bladder and the left vaginal apex.  This was cauterized.  The pelvis was irrigated.  Small bleeders on the bladder from the dissection were then cauterized.  The pressure in the pelvis was brought down to less than 5 mmHg and there was no further evidence of bleeding identified.  The pressure was then increased.  The robot was undocked.  The STRATAFIX suture was then removed laparoscopically.  The gas was then  removed from abdominal cavity.  The ports were then removed under direct visualization.  Skin was closed at all sites using 4-0 Monocryl followed by Exofin.  The Leach catheter was removed.  The bladder was insufflated with 200 cc of normal saline and the 5 mm laparoscope was then used as a cystoscope with findings noted as above.  The Leach catheter was then used to drain the bladder then removed.  The vagina was inspected.  There is no evidence of bleeding identified.  The patient was then awakened and transferred to the recovery room in stable condition.  All counts correct x 2 for procedure.  No complications.  Estimated blood loss is 50 mL.   I was present for the entire procedure. and A physician assistant was required during the procedure for retraction, tissue handling, dissection and suturing.    Patient Disposition:  PACU              SIGNATURE: Trevor Blancas MD  DATE: April 1, 2025  TIME: 12:21 PM

## 2025-04-01 NOTE — ANESTHESIA PREPROCEDURE EVALUATION
Procedure:  ROBOTIC ASSISTED TOTAL LAPAROSCOPIC HYSTERECTOMY, BILATERAL SALPINGECTOMY, EXAMINATION UNDER ANESTHESIA, POSSIBLE EXPLORATORY LAPAROTOMY AND ALL OTHER INDICATED PROCEDURES (Abdomen)    Relevant Problems   HEMATOLOGY   (+) Iron deficiency anemia due to chronic blood loss      PULMONARY   (+) Strep pharyngitis        Physical Exam    Airway    Mallampati score: II  TM Distance: >3 FB  Neck ROM: full     Dental       Cardiovascular      Pulmonary      Other Findings  post-pubertal.      Anesthesia Plan  ASA Score- 2     Anesthesia Type- general with ASA Monitors.         Additional Monitors:     Airway Plan: ETT.           Plan Factors-Exercise tolerance (METS): >4 METS.    Chart reviewed.        Patient is not a current smoker.  Patient did not smoke on day of surgery.    Obstructive sleep apnea risk education given perioperatively.        Induction- intravenous.    Postoperative Plan- Plan for postoperative opioid use. Planned trial extubation    Perioperative Resuscitation Plan - Level 1 - Full Code.       Informed Consent- Anesthetic plan and risks discussed with patient.  I personally reviewed this patient with the CRNA. Discussed and agreed on the Anesthesia Plan with the CRNA..      NPO Status:  Vitals Value Taken Time   Date of last liquid 03/31/25 04/01/25 1003   Time of last liquid 2359 04/01/25 1003   Date of last solid 03/31/25 04/01/25 1003   Time of last solid 2359 04/01/25 1003     Anesthesia plan and consent discussed with Verena who expressed understanding and agreement. Risks/benefits and alternatives discussed with patient including possible PONV, sore throat, damage to teeth/lips/gums and possibility of rare anesthetic and surgical emergencies.

## 2025-04-01 NOTE — PROGRESS NOTES
Informed by Kelli BANKS from lab that the T&S and ABO were hemolyzed. Called into OR 14 and informed Lizzy LIZARRAGA RN that new orders and samples are needed.Lizzy verbalized understanding.

## 2025-04-01 NOTE — DISCHARGE INSTR - AVS FIRST PAGE
[facility] - Gynecologic Oncology  Gucci Palmer, Lamont Stevens and Pasquale   [phone]    Hysterectomy Discharge Instructions    A hysterectomy is surgery to remove your uterus. Your ovaries, fallopian tubes, cervix, or part of your vagina may also need to be removed. The organs and tissue that will be removed depends on your medical condition.  After a hysterectomy, you will not be able to become pregnant.  If your ovaries are removed, you will go through menopause if you have not already.    DISCHARGE INSTRUCTIONS:     What to expect at home:   Recovery from surgery is generally 2-4 weeks, but sometimes longer for more strenuous activity. It is normal to be very tired during this time.   If you had laparoscopic/robotic surgery, you may experience gas pain, abdominal swelling, or shoulder pain for 24-72 hours after surgery. This is from the carbon dioxide gas put into your abdomen to better visualize your organs. A warm shower, heating pad, and/or walking may help.    Contact your doctor at the number above if:   You have a fever over 100.4o.  You have nausea or vomiting that does not improve after a light meal.  Your abdominal or pelvic pain gets worse, even after you take medicine.  You feel pain or burning when you urinate, or you have trouble urinating that worsens over time. Some burning in the first 1-2 days after surgery is expected after removal of the bladder catheter during surgery.  You have pus or a foul-smelling odor coming from your vagina.  Your wound is red, swollen, or drainage is persistent, thick/cloudy or foul smelling.  Clear/pink drainage or a minimal amount of bleeding from incisions can be normal after laparoscopic surgery.  Your arm or leg feels warm, tender, red, swollen and/or painful.   You have heavy vaginal bleeding that fills 1 or more sanitary pads in 1 hour. It is normal to have a small amount of vaginal bleeding or discharge after surgery  that would require the use of a light pantiliner. This discharge may last up to 6 weeks. The bleeding and discharge should be light and should have no odor.     CALL 911 OR GO TO THE EMERGENCY ROOM IF YOU HAVE: Any shortness of breath, difficulty breathing, or chest pain.    Pain  Pain after surgery is a challenging part of the healing process. Pain may be present for weeks but should gradually get better.   Please take extra strength acetaminophen (Tylenol) 1000 mg every 6 hours and then alternate with a NSAIDs such as ibuprofen (Advil, Motrin) 600 mg (3 tablets) every 6 hours to help decrease swelling, pain, and fever.   NSAIDs can cause stomach bleeding or kidney problems in certain people. If you take blood thinner medicine, always ask your healthcare provider if NSAIDs are safe for you. Always read the medicine label and follow directions.  The maximum dose of Tylenol is 4000 mg in 24 hours. The maximum dose of Advil/Motrin is 2400mg in 24 hours.   For moderate to severe pain, please take oxycodone 5 mg PO every 4-6 hours as needed or other narcotic that was prescribed by provider.     Anticoagulation    If provided with a prescription for anticoagulation such as Apixaban (Eliquis), please take the prescribed dose until completed.     Constipation  Constipation is common and it is normal to not have a bowel movement for up to one week after surgery. You should still be passing gas despite constipation and should be able to tolerate both liquid and solid food without nausea or vomiting.  Please take polyethylene glycol (Miralax) 17 g (1 measured capful or 1 packet) once a day. If you have not had a bowel movement 3 days after surgery, you may take the Miralax two times a day. The alternatives to Miralax include Senna and Colace.     If you have any discomfort because of the need to have a bowel movement, you may add milk of magnesia or magnesium citrate (available at your local pharmacy without a prescription)  at any time. Do not take milk of magnesia or magnesium citrate if you have kidney failure.   If you have loose or watery stools, stop taking the medications.     Take your medicine as directed. Contact your healthcare provider if you think your medicine is not helping or if you have side effects. Tell him or her if you are allergic to any medicine. Keep a list of the medicines, vitamins, and herbs you take. Include the amounts, and when and why you take them. Bring the list of the pill bottles to follow-up visits. Carry your medicine list with you in case of an emergency.    Activity:   Rest as needed. Get up and move around as directed to help prevent blood clots. Start with short walks and slowly increase the distance every day.     Stairs are okay to use. Use two feet on each stair to go up and down during the first several days to weeks after surgery.     Do not lift objects heavier than 10 pounds for 6 weeks whether you have small, laparoscopic incisions or you have a large, laparotomy incision.    Avoid strenuous activity for 2 weeks.     You may shower as soon as you return home. You can use soapy water surrounding the incision and let the water run over it. Pat the incision dry after your shower (see wound care section below)     Do not strain during bowel movements. High-fiber foods and extra liquids can help you prevent constipation. Examples of high-fiber foods are fruit and bran. Prune juice and water are good liquids to drink.      Do not have sex, use tampons, or douche for up to 8 weeks.   Do not go into pools or hot tubs for up to 8 weeks and/or until you have been cleared by your doctor.      It is generally safe to drive if you feel strong enough and your reaction time is not compromised and when no longer taking prescription/opioid pain medication    Ask when you may return to work and to other regular activities.    Wound care:   Care for your abdominal incisions as directed. Carefully wash  around the wound with soap and water. If you have Hibiclens or medicated soap that you were instructed to use before surgery, you may use that to wash with for up to 2 days after surgery.  If not, any mild non-scented, non-abrasive soap is safe.  It is okay to let the soap and water run over your incision. Do not scrub your incision. Dry the area and put on new, clean bandages as directed. Change your bandages when they get wet or dirty.     If you have surgical glue over your incision, this will start to flake off 7-10 days postoperatively. When this occurs, you can peel off the remaining glue.    Surgical glue reactions are common. If this occurs, please take a dose of Benadryl 25-50 mg every 4-6 hours. The maximum dose of Benadryl is 300mg/day. You may also apply over-the-counter hydrocortisone around the incision area (not on top of the incision).     Check your incision every day for redness, swelling, or pus.     Deep breathing:   Take deep breaths and cough 10 times each hour. This will decrease your risk of a lung infection as this will open your airway. Take a deep breath and hold it for as long as you can. Let the air out and then cough strongly.     You may be given an incentive spirometer to help you take deep breaths. Put the plastic piece in your mouth and take a slow, deep breath, then let the air out and cough.     Get support:   This surgery may be life-changing for you and your family. You will no longer be able to get pregnant. Sudden changes in the levels of your hormones may occur and cause mood swings and depression. You may feel angry, sad, frightened, or cry frequently and unexpectedly. These feelings are normal. Talk to your healthcare provider about where you can get support. You can also ask if hormone replacement medicine is right for you. Write down your questions so you remember to ask them during your visits.

## 2025-04-01 NOTE — ANESTHESIA POSTPROCEDURE EVALUATION
Post-Op Assessment Note    CV Status:  Stable    Pain management: adequate       Mental Status:  Alert and awake   Hydration Status:  Euvolemic   PONV Controlled:  Controlled   Airway Patency:  Patent     Post Op Vitals Reviewed: Yes    No anethesia notable event occurred.    Staff: CRNA           Last Filed PACU Vitals:  Vitals Value Taken Time   Temp 98.4 °F (36.9 °C) 04/01/25 1238   Pulse 90 04/01/25 1238   /56 04/01/25 1238   Resp 18 04/01/25 1238   SpO2 94 % RA 04/01/25 1238       Modified Flavia:     Vitals Value Taken Time   Activity 2 04/01/25 1238   Respiration 2 04/01/25 1238   Circulation 2 04/01/25 1238   Consciousness 2 04/01/25 1238   Oxygen Saturation 2 04/01/25 1238     Modified Flavia Score: 10

## 2025-04-03 NOTE — ANESTHESIA POSTPROCEDURE EVALUATION
Post-Op Assessment Note    CV Status:  Stable  Pain Score: 0    Pain management: adequate    Multimodal analgesia used between 6 hours prior to anesthesia start to PACU discharge    Mental Status:  Alert   Hydration Status:  Stable   PONV Controlled:  Controlled   Airway Patency:  Patent  Two or more mitigation strategies used for obstructive sleep apnea   Post Op Vitals Reviewed: Yes    No anethesia notable event occurred.    Staff: Anesthesiologist           Last Filed PACU Vitals:  Vitals Value Taken Time   Temp 98.4 °F (36.9 °C) 04/01/25 1238   Pulse 78 04/01/25 1300   /53 04/01/25 1300   Resp 18 04/01/25 1300   SpO2 93 % 04/01/25 1300       Modified Flavia:     Vitals Value Taken Time   Activity 2 04/01/25 1300   Respiration 2 04/01/25 1300   Circulation 2 04/01/25 1300   Consciousness 2 04/01/25 1300   Oxygen Saturation 2 04/01/25 1300     Modified Flavia Score: 10

## 2025-04-04 ENCOUNTER — TELEPHONE (OUTPATIENT)
Dept: GYNECOLOGIC ONCOLOGY | Facility: CLINIC | Age: 45
End: 2025-04-04

## 2025-04-04 NOTE — TELEPHONE ENCOUNTER
Late entry     I called patient and confirmed . This patient is part of the Airseal study.     The patient is overall feeling well.  Her max pain is 6/10 on POD#1. She has used 1 oxycodone pills.     She has follow up scheduled on 25 with Dr. Magalis Champion MD

## 2025-04-06 ENCOUNTER — TELEPHONE (OUTPATIENT)
Dept: OTHER | Facility: OTHER | Age: 45
End: 2025-04-06

## 2025-04-06 NOTE — TELEPHONE ENCOUNTER
"Pt stated, \"I am post-op hysterectomy and I have redness near my incision area. The incision is not open and there is no pain or bleeding.\"     On call notified via secure chat  "

## 2025-04-07 NOTE — TELEPHONE ENCOUNTER
GYN ONC FELLOW TELEPHONE ENCOUNTER    Late entry, returned pt call at 1PM.     Briefly, this is a 44 y.o. POD5 s/p SHWETHA LYNN for AUB calling with erythema on abdomen near right most port site.   Reviewed my chart images. Patient reports she noted new redness last night. Since then it has not expanded nor does it feel firm to the touch. She notes she feels sore but overall has been doing well, recovering appropriately. I reviewed with patient that her incisions look intact and healing well. The new area of erythema is most consistent with ecchymoses after surgery. Precautions reviewed, all questions answered.     YOVANI Delatorre MD PGY-7  Gynecologic Oncology Fellow

## 2025-04-24 ENCOUNTER — OFFICE VISIT (OUTPATIENT)
Age: 45
End: 2025-04-24

## 2025-04-24 VITALS
HEIGHT: 67 IN | HEART RATE: 85 BPM | TEMPERATURE: 98.6 F | SYSTOLIC BLOOD PRESSURE: 124 MMHG | DIASTOLIC BLOOD PRESSURE: 70 MMHG | WEIGHT: 224 LBS | RESPIRATION RATE: 18 BRPM | BODY MASS INDEX: 35.16 KG/M2 | OXYGEN SATURATION: 98 %

## 2025-04-24 DIAGNOSIS — D50.0 IRON DEFICIENCY ANEMIA DUE TO CHRONIC BLOOD LOSS: ICD-10-CM

## 2025-04-24 DIAGNOSIS — D25.0 SUBMUCOUS MYOMA OF UTERUS: Primary | ICD-10-CM

## 2025-04-24 PROCEDURE — 99024 POSTOP FOLLOW-UP VISIT: CPT | Performed by: OBSTETRICS & GYNECOLOGY

## 2025-04-24 NOTE — ASSESSMENT & PLAN NOTE
Preoperative hemoglobin 3/26/2025 9.5 g/dL.  1.  Repeat CBC prior to her next visit in 4 weeks  Orders:    CBC and differential; Future

## 2025-04-24 NOTE — ASSESSMENT & PLAN NOTE
44-year-old status post robotic assisted total laparoscopic hysterectomy, bilateral salpingectomy, cystoscopy 4/1/2025 for submucous myoma, abnormal uterine bleeding, chronic blood loss anemia.  Final pathology was benign.  There was no evidence of hyperplasia, dysplasia, malignancy.  She is recovering well from surgery.  Her performance status is 0.  1.  Return in 4 weeks for postoperative pelvic semination  2.  We discussed ongoing activity limitations

## 2025-04-24 NOTE — LETTER
2025     Marilin Berry  [address]  [address]  [facility] [address]    Patient: Jacklyn De Santiago   YOB: 1980   Date of Visit: 2025       Dear Dr. Marilin Berry:    Thank you for referring Jacklyn De Santiago to me for evaluation. Below are my notes for this consultation.    If you have questions, please do not hesitate to call me. I look forward to following your patient along with you.         Sincerely,        Trevor Blancas MD        CC: No Recipients    Trevor Blancas MD  2025  1:07 PM  Sign when Signing Visit  Name: Jacklyn De Santiago      : 1980      MRN: [id number]  Encounter Provider: Trevor Blancas MD  Encounter Date: 2025   Encounter department: [facility] GYNECOLOGY ONCOLOGY [facility]  :  Assessment & Plan  Submucous myoma of uterus  44-year-old status post robotic assisted total laparoscopic hysterectomy, bilateral salpingectomy, cystoscopy 2025 for submucous myoma, abnormal uterine bleeding, chronic blood loss anemia.  Final pathology was benign.  There was no evidence of hyperplasia, dysplasia, malignancy.  She is recovering well from surgery.  Her performance status is 0.  1.  Return in 4 weeks for postoperative pelvic semination  2.  We discussed ongoing activity limitations       Iron deficiency anemia due to chronic blood loss  Preoperative hemoglobin 3/26/2025 9.5 g/dL.  1.  Repeat CBC prior to her next visit in 4 weeks  Orders:  •  CBC and differential; Future            History of Present Illness  Reason for Visit / CC: Postoperative evaluation   Jacklyn De Santiago is a 44 y.o. female   History of Present Illness  Who returns for postoperative evaluation.  She has no new complaints.  No vaginal bleeding.  She has been afebrile.  She does not require pain medication.  She has occasional discomfort from the right sided port site.  She is tolerating diet well.  She is ambulating.  No  other interval change in medications or medical history since the surgery.  Pertinent Medical History         Review of Systems A complete review of systems is negative other than that noted above in the HPI.  Past Medical History  Past Medical History:   Diagnosis Date   • GERD (gastroesophageal reflux disease)      Past Surgical History:   Procedure Laterality Date   •  SECTION      3   • CYSTOSCOPY N/A 2025    Procedure: CYSTOSCOPY;  Surgeon: Trevor Blancas MD;  [facility]: [facility];  Service: Gynecology Oncology   • DILATION AND CURETTAGE OF UTERUS     • DILATION AND EVACUATION     • HERNIA REPAIR     • OR LAPS TOTAL HYSTERECT 250 GM/< W/RMVL TUBE/OVARY N/A 2025    Procedure: ROBOTIC ASSISTED TOTAL LAPAROSCOPIC HYSTERECTOMY, BILATERAL SALPINGECTOMY, EXAMINATION UNDER ANESTHESIA;  Surgeon: Trevor Blancas MD;  Location: [facility];  Service: Gynecology Oncology     Family History   Problem Relation Age of Onset   • Breast cancer Mother    • Melanoma Maternal Aunt    • Colon cancer Paternal Grandfather       reports that she has never smoked. She has never used smokeless tobacco. She reports current alcohol use. She reports that she does not use drugs.  Current Outpatient Medications   Medication Instructions   • acetaminophen (TYLENOL) 650 mg, As needed   • clobetasol (TEMOVATE) 0.05 % ointment PLEASE SEE ATTACHED FOR DETAILED DIRECTIONS   • diphenhydrAMINE (BENADRYL) 25 mg, As needed   • famotidine (PEPCID) 20 mg, 2 times daily   • omeprazole (PRILOSEC) 20 mg, Daily   • Zepbound 2.5 MG/0.5ML auto-injector INJECT 2.5 MILLIGRAMS SUBCUTANEOUS EVERY WEEK     Allergies   Allergen Reactions   • Penicillin V Hives and Swelling   • Penicillins Hives and Swelling         Objective  LMP 03/10/2025 (Within Weeks)     There is no height or weight on file to calculate BMI.  Pain Screening:     ECOG   0  Physical Exam  Vitals reviewed.   Constitutional:       General: She is not in acute  "distress.     Appearance: Normal appearance.   HENT:      Head: Normocephalic and atraumatic.      Mouth/Throat:      Mouth: Mucous membranes are moist.   Pulmonary:      Effort: Pulmonary effort is normal.      Breath sounds: Normal breath sounds.   Abdominal:      Palpations: Abdomen is soft. There is no mass.      Tenderness: There is no abdominal tenderness.   Skin:     General: Skin is warm and dry.      Comments: Surgical trocar sites are intact, clean and dry without induration, erythema or purulent drainage.    Neurological:      Mental Status: She is alert and oriented to person, place, and time.   Psychiatric:         Mood and Affect: Mood normal.         Behavior: Behavior normal.         Thought Content: Thought content normal.         Judgment: Judgment normal.       Physical Exam       Results    Labs: I have reviewed pertinent labs.   No results found for: \"\"  Lab Results   Component Value Date/Time    Potassium 4.2 03/26/2025 10:15 AM    Chloride 105 03/26/2025 10:15 AM    CO2 27 03/26/2025 10:15 AM    BUN 12 03/26/2025 10:15 AM    Creatinine 0.70 03/26/2025 10:15 AM    Glucose, Fasting 88 03/26/2025 10:15 AM    Calcium 8.8 03/26/2025 10:15 AM    eGFR 104 03/26/2025 10:15 AM     Lab Results   Component Value Date/Time    WBC 7.23 03/26/2025 10:15 AM    Hemoglobin 9.5 (L) 03/26/2025 10:15 AM    Hematocrit 33.4 (L) 03/26/2025 10:15 AM    MCV 74 (L) 03/26/2025 10:15 AM    Platelets 400 (H) 03/26/2025 10:15 AM     No results found for: \"NEUTROABS\"     Trend:  No results found for: \"\"      Other Study Results Review : Pathology reports reviewed.      "

## 2025-04-24 NOTE — PROGRESS NOTES
Name: Jacklyn De Santiago      : 1980      MRN: [id number]  Encounter Provider: Trevor Blancas MD  Encounter Date: 2025   Encounter department: [facility] GYNECOLOGY ONCOLOGY [facility]  :  Assessment & Plan  Submucous myoma of uterus  44-year-old status post robotic assisted total laparoscopic hysterectomy, bilateral salpingectomy, cystoscopy 2025 for submucous myoma, abnormal uterine bleeding, chronic blood loss anemia.  Final pathology was benign.  There was no evidence of hyperplasia, dysplasia, malignancy.  She is recovering well from surgery.  Her performance status is 0.  1.  Return in 4 weeks for postoperative pelvic semination  2.  We discussed ongoing activity limitations       Iron deficiency anemia due to chronic blood loss  Preoperative hemoglobin 3/26/2025 9.5 g/dL.  1.  Repeat CBC prior to her next visit in 4 weeks  Orders:    CBC and differential; Future            History of Present Illness   Reason for Visit / CC: Postoperative evaluation   Jacklyn De Santiago is a 44 y.o. female   History of Present Illness  Who returns for postoperative evaluation.  She has no new complaints.  No vaginal bleeding.  She has been afebrile.  She does not require pain medication.  She has occasional discomfort from the right sided port site.  She is tolerating diet well.  She is ambulating.  No other interval change in medications or medical history since the surgery.  Pertinent Medical History          Review of Systems A complete review of systems is negative other than that noted above in the HPI.  Past Medical History   Past Medical History:   Diagnosis Date    GERD (gastroesophageal reflux disease)      Past Surgical History:   Procedure Laterality Date     SECTION      3    CYSTOSCOPY N/A 2025    Procedure: CYSTOSCOPY;  Surgeon: Trevor Blancas MD;  [facility];  Service: Gynecology Oncology    DILATION AND CURETTAGE OF UTERUS       DILATION AND EVACUATION      HERNIA REPAIR      RI LAPS TOTAL HYSTERECT 250 GM/< W/RMVL TUBE/OVARY N/A 4/1/2025    Procedure: ROBOTIC ASSISTED TOTAL LAPAROSCOPIC HYSTERECTOMY, BILATERAL SALPINGECTOMY, EXAMINATION UNDER ANESTHESIA;  Surgeon: Trevor Blancas MD;  [facility];  Service: Gynecology Oncology     Family History   Problem Relation Age of Onset    Breast cancer Mother     Melanoma Maternal Aunt     Colon cancer Paternal Grandfather       reports that she has never smoked. She has never used smokeless tobacco. She reports current alcohol use. She reports that she does not use drugs.  Current Outpatient Medications   Medication Instructions    acetaminophen (TYLENOL) 650 mg, As needed    clobetasol (TEMOVATE) 0.05 % ointment PLEASE SEE ATTACHED FOR DETAILED DIRECTIONS    diphenhydrAMINE (BENADRYL) 25 mg, As needed    famotidine (PEPCID) 20 mg, 2 times daily    omeprazole (PRILOSEC) 20 mg, Daily    Zepbound 2.5 MG/0.5ML auto-injector INJECT 2.5 MILLIGRAMS SUBCUTANEOUS EVERY WEEK     Allergies   Allergen Reactions    Penicillin V Hives and Swelling    Penicillins Hives and Swelling         Objective   LMP 03/10/2025 (Within Weeks)     There is no height or weight on file to calculate BMI.  Pain Screening:     ECOG   0  Physical Exam  Vitals reviewed.   Constitutional:       General: She is not in acute distress.     Appearance: Normal appearance.   HENT:      Head: Normocephalic and atraumatic.      Mouth/Throat:      Mouth: Mucous membranes are moist.   Pulmonary:      Effort: Pulmonary effort is normal.      Breath sounds: Normal breath sounds.   Abdominal:      Palpations: Abdomen is soft. There is no mass.      Tenderness: There is no abdominal tenderness.   Skin:     General: Skin is warm and dry.      Comments: Surgical trocar sites are intact, clean and dry without induration, erythema or purulent drainage.    Neurological:      Mental Status: She is alert and oriented to person, place,  "and time.   Psychiatric:         Mood and Affect: Mood normal.         Behavior: Behavior normal.         Thought Content: Thought content normal.         Judgment: Judgment normal.       Physical Exam       Results    Labs: I have reviewed pertinent labs.   No results found for: \"\"  Lab Results   Component Value Date/Time    Potassium 4.2 03/26/2025 10:15 AM    Chloride 105 03/26/2025 10:15 AM    CO2 27 03/26/2025 10:15 AM    BUN 12 03/26/2025 10:15 AM    Creatinine 0.70 03/26/2025 10:15 AM    Glucose, Fasting 88 03/26/2025 10:15 AM    Calcium 8.8 03/26/2025 10:15 AM    eGFR 104 03/26/2025 10:15 AM     Lab Results   Component Value Date/Time    WBC 7.23 03/26/2025 10:15 AM    Hemoglobin 9.5 (L) 03/26/2025 10:15 AM    Hematocrit 33.4 (L) 03/26/2025 10:15 AM    MCV 74 (L) 03/26/2025 10:15 AM    Platelets 400 (H) 03/26/2025 10:15 AM     No results found for: \"NEUTROABS\"     Trend:  No results found for: \"\"      Other Study Results Review : Pathology reports reviewed.      "

## 2025-05-01 ENCOUNTER — TELEPHONE (OUTPATIENT)
Age: 45
End: 2025-05-01

## 2025-05-01 NOTE — TELEPHONE ENCOUNTER
Patient called to see if there is a way she can be seen sooner to possibly clear her to go in the pool. States at the last visit she was told to follow up in 1 month but unfortunately there wasn't an appointment for several weeks after the 1 month nayla. She is wondering if there is any chance to squeeze her in 5/22 or even 6/5 (those are the Thursdays she is available to see him). She said the pool opens 5/24 and was hoping to have an idea if she can go in by then or close to that date. As of now there aren't available openings on those dates, please contact patient if there is something that can be done to accommodate.

## 2025-05-01 NOTE — TELEPHONE ENCOUNTER
Called and spoke with patient about appointment.  Offered 5/29 at 2 pm. Patient declined due to possible Field trip and requested later in the day.  Patient is scheduled for 5/29 at 3 pm in [facility] with Jesi.

## 2025-05-22 LAB
BASOPHILS # BLD AUTO: 0.1 X10E3/UL (ref 0–0.2)
BASOPHILS NFR BLD AUTO: 1 %
EOSINOPHIL # BLD AUTO: 0.1 X10E3/UL (ref 0–0.4)
EOSINOPHIL NFR BLD AUTO: 1 %
ERYTHROCYTE [DISTWIDTH] IN BLOOD BY AUTOMATED COUNT: 17.4 % (ref 11.7–15.4)
HCT VFR BLD AUTO: 33 % (ref 34–46.6)
HGB BLD-MCNC: 9.6 G/DL (ref 11.1–15.9)
IMM GRANULOCYTES # BLD: 0 X10E3/UL (ref 0–0.1)
IMM GRANULOCYTES NFR BLD: 0 %
LYMPHOCYTES # BLD AUTO: 2.5 X10E3/UL (ref 0.7–3.1)
LYMPHOCYTES NFR BLD AUTO: 30 %
MCH RBC QN AUTO: 21.1 PG (ref 26.6–33)
MCHC RBC AUTO-ENTMCNC: 29.1 G/DL (ref 31.5–35.7)
MCV RBC AUTO: 72 FL (ref 79–97)
MONOCYTES # BLD AUTO: 0.5 X10E3/UL (ref 0.1–0.9)
MONOCYTES NFR BLD AUTO: 6 %
NEUTROPHILS # BLD AUTO: 5.1 X10E3/UL (ref 1.4–7)
NEUTROPHILS NFR BLD AUTO: 62 %
PLATELET # BLD AUTO: 364 X10E3/UL (ref 150–450)
RBC # BLD AUTO: 4.56 X10E6/UL (ref 3.77–5.28)
WBC # BLD AUTO: 8.3 X10E3/UL (ref 3.4–10.8)

## 2025-05-29 ENCOUNTER — OFFICE VISIT (OUTPATIENT)
Age: 45
End: 2025-05-29

## 2025-05-29 VITALS
OXYGEN SATURATION: 99 % | BODY MASS INDEX: 33.81 KG/M2 | DIASTOLIC BLOOD PRESSURE: 66 MMHG | HEIGHT: 67 IN | HEART RATE: 100 BPM | RESPIRATION RATE: 16 BRPM | WEIGHT: 215.4 LBS | SYSTOLIC BLOOD PRESSURE: 122 MMHG | TEMPERATURE: 97.8 F

## 2025-05-29 DIAGNOSIS — N90.1 VIN II (VULVAR INTRAEPITHELIAL NEOPLASIA II): ICD-10-CM

## 2025-05-29 DIAGNOSIS — Z90.710 S/P LAPAROSCOPIC HYSTERECTOMY: Primary | ICD-10-CM

## 2025-05-29 DIAGNOSIS — D50.0 IRON DEFICIENCY ANEMIA DUE TO CHRONIC BLOOD LOSS: ICD-10-CM

## 2025-05-29 PROCEDURE — 99024 POSTOP FOLLOW-UP VISIT: CPT | Performed by: PHYSICIAN ASSISTANT

## 2025-05-29 RX ORDER — TIRZEPATIDE 5 MG/.5ML
7.5 INJECTION, SOLUTION SUBCUTANEOUS
COMMUNITY
Start: 2025-05-01

## 2025-05-29 RX ORDER — SODIUM CHLORIDE 9 MG/ML
20 INJECTION, SOLUTION INTRAVENOUS ONCE
OUTPATIENT
Start: 2025-06-16

## 2025-05-29 NOTE — ASSESSMENT & PLAN NOTE
44-year-old with AUB, submuscosal myoma and iron deficiency anemia now s/p robotic-assisted TLH, bilateral salpingectomy and cystoscopy on 4/1/25. Final pathology was benign. She has recovered well from surgery. Her vaginal cuff is healed.     Discussed continued pelvic rest x 1-2 weeks.

## 2025-05-29 NOTE — PROGRESS NOTES
Name: Jacklyn De Santiago      : 1980      MRN: [id number]  Encounter Provider: Jesi Serrano PA-C  Encounter Date: 2025   Encounter department: [facility] GYNECOLOGY ONCOLOGY [facility]  :  Assessment & Plan  Iron deficiency anemia due to chronic blood loss  Post-operative CBC on 25 with persistent anemia. No improvement compared to pre-operative value.     Discussed risks/benefits of IV venofer due to persistent iron deficiency anemia related to AUB.     Plan for venofer 200 mg IV weekly x 5.     Repeat CBC/Diff planned for early 2025.   Orders:  •  CBC and differential; Future    SUSY II (vulvar intraepithelial neoplasia II)  Follow-up as scheduled in 2025 for vulvar colposcopy.        S/P robotic assisted laparoscopic hysterectomy with bilateral salpingectomy  44-year-old with AUB, submuscosal myoma and iron deficiency anemia now s/p robotic-assisted TLH, bilateral salpingectomy and cystoscopy on 25. Final pathology was benign. She has recovered well from surgery. Her vaginal cuff is healed.     Discussed continued pelvic rest x 1-2 weeks.                History of Present Illness     Reason for Visit / CC:  Post-op Visit    Jacklyn De Santiago is a 44 y.o. female   who presents to the office for continued post-operative evaluation. She has recovered well from surgery. She has been afebrile. Denies n/v/abdominal pain. Normal bowel/bladder function. Denies vaginal bleeding or discharge. Repeat CBC/Diff performed on 25 notes persistent anemia following hysterectomy. Stable from pre-op.         Pertinent Medical History   AUB, submucosal myoma, chronic blood loss anemia    Robotic-assisted TLH, bilateral salpingectomy and cystoscopy on 25.          Review of Systems   Constitutional: Negative.    Respiratory: Negative.     Cardiovascular: Negative.    Gastrointestinal: Negative.    Genitourinary: Negative.    Skin: Negative.     A complete  "review of systems is negative other than that noted above in the HPI.  Medical History Reviewed by provider this encounter:     .  Medications Ordered Prior to Encounter[1]      Objective   /66 (BP Location: Left arm, Patient Position: Sitting, Cuff Size: Large)   Pulse 100   Temp 97.8 °F (36.6 °C) (Temporal)   Resp 16   Ht 5' 7\" (1.702 m)   Wt 97.7 kg (215 lb 6.4 oz)   LMP 03/10/2025 (Within Weeks)   SpO2 99%   BMI 33.74 kg/m²     Body mass index is 33.74 kg/m².  Pain Screening:  Pain Score: 0-No pain    Physical Exam  Vitals reviewed. Exam conducted with a chaperone present.   Constitutional:       General: She is not in acute distress.     Appearance: Normal appearance.   HENT:      Head: Normocephalic and atraumatic.      Mouth/Throat:      Mouth: Mucous membranes are moist.   Abdominal:      Palpations: Abdomen is soft. There is no mass.      Tenderness: There is no abdominal tenderness.   Genitourinary:     Comments: The external female genitalia is normal. The bartholin's, uretheral and skenes glands are normal. The urethral meatus is normal (midline with no lesions). Anus without fissure or lesion. Speculum exam reveals a grossly normal vagina. Vagina is intact, without dehiscense. No masses, lesions, discharge or bleeding. No significant cystocele or rectocele noted. Bimanual exam notes a surgical absent cervix, uterus and adnexal structures. No masses or fullness. Bladder is without fullness, mass or tenderness.    Skin:     General: Skin is warm and dry.      Comments: Surgical trocar sites are well healed.      Neurological:      Mental Status: She is alert and oriented to person, place, and time.     Psychiatric:         Mood and Affect: Mood normal.         Behavior: Behavior normal.         Thought Content: Thought content normal.         Judgment: Judgment normal.          Labs: I have reviewed pertinent labs.   Lab Results   Component Value Date/Time    WBC 7.23 03/26/2025 10:15 AM    " White Blood Cell Count 8.3 05/21/2025 12:00 AM    RBC 4.49 03/26/2025 10:15 AM    Red Blood Cell Count 4.56 05/21/2025 12:00 AM    Hemoglobin 9.6 (L) 05/21/2025 12:00 AM    Hemoglobin 9.5 (L) 03/26/2025 10:15 AM    Hematocrit 33.4 (L) 03/26/2025 10:15 AM    HCT 33.0 (L) 05/21/2025 12:00 AM    MCV 72 (L) 05/21/2025 12:00 AM    MCV 74 (L) 03/26/2025 10:15 AM    MCH 21.1 (L) 05/21/2025 12:00 AM    MCH 21.2 (L) 03/26/2025 10:15 AM    RDW 17.4 (H) 05/21/2025 12:00 AM    RDW 17.5 (H) 03/26/2025 10:15 AM    Platelet Count 364 05/21/2025 12:00 AM    Platelets 400 (H) 03/26/2025 10:15 AM    Neutrophils 62 05/21/2025 12:00 AM    Lymphocytes 30 05/21/2025 12:00 AM    Monocytes 6 05/21/2025 12:00 AM    Eosinophils 1 05/21/2025 12:00 AM    Neutrophils (Absolute) 5.1 05/21/2025 12:00 AM                         [1]  Current Outpatient Medications on File Prior to Visit   Medication Sig Dispense Refill   • acetaminophen (TYLENOL) 650 mg CR tablet Take 650 mg by mouth as needed for mild pain     • clobetasol (TEMOVATE) 0.05 % ointment      • diphenhydrAMINE (BENADRYL) 25 mg capsule Take 25 mg by mouth as needed for itching     • famotidine (PEPCID) 10 mg tablet Take 20 mg by mouth in the morning and 20 mg in the evening. (Patient taking differently: Take 20 mg by mouth if needed)     • omeprazole (PriLOSEC) 20 mg delayed release capsule Take 20 mg by mouth in the morning.     • Zepbound 2.5 MG/0.5ML auto-injector      • Zepbound 5 MG/0.5ML auto-injector 7.5 mg (Patient not taking: Reported on 5/29/2025)       No current facility-administered medications on file prior to visit.

## 2025-05-29 NOTE — ASSESSMENT & PLAN NOTE
Post-operative CBC on 5/21/25 with persistent anemia. No improvement compared to pre-operative value.     Discussed risks/benefits of IV venofer due to persistent iron deficiency anemia related to AUB.     Plan for venofer 200 mg IV weekly x 5.     Repeat CBC/Diff planned for early August 2025.   Orders:  •  CBC and differential; Future

## 2025-06-16 ENCOUNTER — TELEPHONE (OUTPATIENT)
Age: 45
End: 2025-06-16

## 2025-06-16 ENCOUNTER — HOSPITAL ENCOUNTER (OUTPATIENT)
Dept: INFUSION CENTER | Facility: HOSPITAL | Age: 45
Discharge: HOME | End: 2025-06-16
Attending: OBSTETRICS & GYNECOLOGY
Payer: COMMERCIAL

## 2025-06-16 VITALS
OXYGEN SATURATION: 100 % | DIASTOLIC BLOOD PRESSURE: 60 MMHG | HEART RATE: 80 BPM | TEMPERATURE: 98.2 F | SYSTOLIC BLOOD PRESSURE: 128 MMHG | RESPIRATION RATE: 17 BRPM

## 2025-06-16 DIAGNOSIS — D50.0 IRON DEFICIENCY ANEMIA DUE TO CHRONIC BLOOD LOSS: Primary | ICD-10-CM

## 2025-06-16 PROCEDURE — 96365 THER/PROPH/DIAG IV INF INIT: CPT

## 2025-06-16 RX ORDER — SODIUM CHLORIDE 9 MG/ML
20 INJECTION, SOLUTION INTRAVENOUS ONCE
Status: COMPLETED | OUTPATIENT
Start: 2025-06-16 | End: 2025-06-16

## 2025-06-16 RX ORDER — TIRZEPATIDE 7.5 MG/.5ML
INJECTION, SOLUTION SUBCUTANEOUS
COMMUNITY
Start: 2025-06-02

## 2025-06-16 RX ORDER — SODIUM CHLORIDE 9 MG/ML
20 INJECTION, SOLUTION INTRAVENOUS ONCE
Status: CANCELLED | OUTPATIENT
Start: 2025-06-23

## 2025-06-16 RX ADMIN — SODIUM CHLORIDE 200 MG: 9 INJECTION, SOLUTION INTRAVENOUS at 12:18

## 2025-06-16 RX ADMIN — SODIUM CHLORIDE 20 ML/HR: 9 INJECTION, SOLUTION INTRAVENOUS at 12:17

## 2025-06-16 NOTE — TELEPHONE ENCOUNTER
Patient is calling to double check that everything is ok with her insurance coverage for her iron infusions.  She is scheduled for her 1st iron infusion today @ 12:00 pm.  Please can someone call her to confirm this prior to the appointment.

## 2025-06-16 NOTE — PROGRESS NOTES
Jacklyn De Santiago  tolerated treatment well with no complications.      Jacklyn De Santiago is aware of future appt on 6/23 at 0830.     AVS printed and given to Jacklyn De Santiago:  No (Declined by Jacklyn De Santiago)

## 2025-06-16 NOTE — TELEPHONE ENCOUNTER
Called patient to go over available times on 6/30. At the moment [facility] does not have availability the morning of 6/30. Patient expressed understanding and stated she will check in again at a later date.

## 2025-06-16 NOTE — TELEPHONE ENCOUNTER
Patient is calling to see if there are any earlier times for her infusion appointment on 6/30?  She is looking for something in the morning.

## 2025-06-23 ENCOUNTER — HOSPITAL ENCOUNTER (OUTPATIENT)
Dept: INFUSION CENTER | Facility: HOSPITAL | Age: 45
Discharge: HOME | End: 2025-06-23
Attending: OBSTETRICS & GYNECOLOGY
Payer: COMMERCIAL

## 2025-06-23 VITALS
OXYGEN SATURATION: 100 % | DIASTOLIC BLOOD PRESSURE: 75 MMHG | HEART RATE: 70 BPM | TEMPERATURE: 97 F | RESPIRATION RATE: 16 BRPM | SYSTOLIC BLOOD PRESSURE: 132 MMHG

## 2025-06-23 DIAGNOSIS — D50.0 IRON DEFICIENCY ANEMIA DUE TO CHRONIC BLOOD LOSS: Primary | ICD-10-CM

## 2025-06-23 PROCEDURE — 96365 THER/PROPH/DIAG IV INF INIT: CPT

## 2025-06-23 RX ORDER — SODIUM CHLORIDE 9 MG/ML
20 INJECTION, SOLUTION INTRAVENOUS ONCE
Status: COMPLETED | OUTPATIENT
Start: 2025-06-23 | End: 2025-06-23

## 2025-06-23 RX ORDER — SODIUM CHLORIDE 9 MG/ML
20 INJECTION, SOLUTION INTRAVENOUS ONCE
OUTPATIENT
Start: 2025-06-30

## 2025-06-23 RX ADMIN — IRON SUCROSE 200 MG: 20 INJECTION, SOLUTION INTRAVENOUS at 08:31

## 2025-06-23 RX ADMIN — SODIUM CHLORIDE 20 ML/HR: 9 INJECTION, SOLUTION INTRAVENOUS at 08:31

## 2025-06-23 NOTE — PROGRESS NOTES
Jacklyn De Santiago  tolerated treatment well with no complications.      Jacklyn De Santiago is aware of future appt on 7/7/25 at 0830.     AVS printed and given to Jacklyn De Santiago:  No (Declined by Jacklyn De Santiago)

## 2025-06-30 ENCOUNTER — HOSPITAL ENCOUNTER (OUTPATIENT)
Dept: INFUSION CENTER | Facility: HOSPITAL | Age: 45
Discharge: HOME | End: 2025-06-30
Attending: OBSTETRICS & GYNECOLOGY
Payer: COMMERCIAL

## 2025-06-30 VITALS
SYSTOLIC BLOOD PRESSURE: 117 MMHG | OXYGEN SATURATION: 97 % | HEART RATE: 67 BPM | DIASTOLIC BLOOD PRESSURE: 83 MMHG | TEMPERATURE: 96.8 F

## 2025-06-30 DIAGNOSIS — D50.0 IRON DEFICIENCY ANEMIA DUE TO CHRONIC BLOOD LOSS: Primary | ICD-10-CM

## 2025-06-30 PROCEDURE — 96365 THER/PROPH/DIAG IV INF INIT: CPT

## 2025-06-30 RX ORDER — SODIUM CHLORIDE 9 MG/ML
20 INJECTION, SOLUTION INTRAVENOUS ONCE
Status: CANCELLED | OUTPATIENT
Start: 2025-07-07

## 2025-06-30 RX ORDER — SODIUM CHLORIDE 9 MG/ML
20 INJECTION, SOLUTION INTRAVENOUS ONCE
Status: COMPLETED | OUTPATIENT
Start: 2025-06-30 | End: 2025-06-30

## 2025-06-30 RX ADMIN — IRON SUCROSE 200 MG: 20 INJECTION, SOLUTION INTRAVENOUS at 08:37

## 2025-06-30 RX ADMIN — SODIUM CHLORIDE 20 ML/HR: 9 INJECTION, SOLUTION INTRAVENOUS at 08:37

## 2025-06-30 NOTE — PROGRESS NOTES
Jacklyn De Santiago  tolerated treatment well with no complications.      Jacklyn De Santiago is aware of future appt on 7/7/2025 at 0830.     AVS printed and given to Jacklyn De Santiago:    No (Declined by Jacklyn De Santiago)

## 2025-07-07 ENCOUNTER — HOSPITAL ENCOUNTER (OUTPATIENT)
Dept: INFUSION CENTER | Facility: HOSPITAL | Age: 45
Discharge: HOME | End: 2025-07-07
Attending: OBSTETRICS & GYNECOLOGY
Payer: COMMERCIAL

## 2025-07-07 VITALS
SYSTOLIC BLOOD PRESSURE: 108 MMHG | DIASTOLIC BLOOD PRESSURE: 63 MMHG | RESPIRATION RATE: 16 BRPM | HEART RATE: 67 BPM | TEMPERATURE: 97.5 F | OXYGEN SATURATION: 100 %

## 2025-07-07 DIAGNOSIS — D50.0 IRON DEFICIENCY ANEMIA DUE TO CHRONIC BLOOD LOSS: Primary | ICD-10-CM

## 2025-07-07 PROCEDURE — 96365 THER/PROPH/DIAG IV INF INIT: CPT

## 2025-07-07 RX ORDER — SODIUM CHLORIDE 9 MG/ML
20 INJECTION, SOLUTION INTRAVENOUS ONCE
Status: CANCELLED | OUTPATIENT
Start: 2025-07-14

## 2025-07-07 RX ORDER — SODIUM CHLORIDE 9 MG/ML
20 INJECTION, SOLUTION INTRAVENOUS ONCE
Status: COMPLETED | OUTPATIENT
Start: 2025-07-07 | End: 2025-07-07

## 2025-07-07 RX ADMIN — IRON SUCROSE 200 MG: 20 INJECTION, SOLUTION INTRAVENOUS at 08:49

## 2025-07-07 RX ADMIN — SODIUM CHLORIDE 20 ML/HR: 9 INJECTION, SOLUTION INTRAVENOUS at 08:49

## 2025-07-07 NOTE — PROGRESS NOTES
..Jacklyn De Santiago  tolerated treatment well with no complications.      Jacklyn De Santiago is aware of future appt on 7/14 at 8:00.     AVS printed and given to Jacklyn De Santiago:  No (Declined by Jacklyn De Santiago)

## 2025-07-14 ENCOUNTER — HOSPITAL ENCOUNTER (OUTPATIENT)
Dept: INFUSION CENTER | Facility: HOSPITAL | Age: 45
Discharge: HOME | End: 2025-07-14
Attending: OBSTETRICS & GYNECOLOGY
Payer: COMMERCIAL

## 2025-07-14 VITALS
SYSTOLIC BLOOD PRESSURE: 117 MMHG | OXYGEN SATURATION: 99 % | DIASTOLIC BLOOD PRESSURE: 62 MMHG | TEMPERATURE: 97.6 F | HEART RATE: 81 BPM | RESPIRATION RATE: 16 BRPM

## 2025-07-14 DIAGNOSIS — D50.0 IRON DEFICIENCY ANEMIA DUE TO CHRONIC BLOOD LOSS: Primary | ICD-10-CM

## 2025-07-14 PROCEDURE — 96365 THER/PROPH/DIAG IV INF INIT: CPT

## 2025-07-14 RX ORDER — SODIUM CHLORIDE 9 MG/ML
20 INJECTION, SOLUTION INTRAVENOUS ONCE
Status: COMPLETED | OUTPATIENT
Start: 2025-07-14 | End: 2025-07-14

## 2025-07-14 RX ORDER — SODIUM CHLORIDE 9 MG/ML
20 INJECTION, SOLUTION INTRAVENOUS ONCE
Status: CANCELLED | OUTPATIENT
Start: 2025-07-14

## 2025-07-14 RX ADMIN — SODIUM CHLORIDE 20 ML/HR: 9 INJECTION, SOLUTION INTRAVENOUS at 08:20

## 2025-07-14 RX ADMIN — IRON SUCROSE 200 MG: 20 INJECTION, SOLUTION INTRAVENOUS at 08:20

## 2025-07-14 NOTE — PROGRESS NOTES
..Jacklyn Anyi  tolerated treatment well with no complications.      Jacklyn Anyi has completed her Therapy Plan and has no more appointments to be scheduled at this time.     AVS printed and given to Jacklyn De Santiago:  No (Declined by Jacklyn De Santiago)

## 2025-07-22 DIAGNOSIS — E66.01 MORBID OBESITY (HCC): Primary | ICD-10-CM

## 2025-07-22 NOTE — TELEPHONE ENCOUNTER
Patient would like to be increased to the 10 mg dose. She feels like she plateau at the 7.5 mg.     Reason for call:   [x] Refill   [] Prior Auth  [] Other:     Office:   [] PCP/Provider -   [x] Specialty/Provider - Endo     Medication: Zepbound     Dose/Frequency: 10 mg/0.5 mL auto-injector. Inject 0.5 mL under the skin once a week for 28 days     Quantity: 2 mL     Pharmacy:   [facility]/pharmacy #[id number] - [place], [place] -   [address] [phone]     Local Pharmacy   Does the patient have enough for 3 days?   [x] Yes (Next injection due Monday)  [] No - Send as HP to POD    How are you tolerating the medication?   [] Nausea  [] Vomiting  [] Diarrhea  [x] Asymptomatic  [] Other:    Last visit weight: 242 lbs    Current weight: 202 lbs     Date of last injection: 7/21/25    How many injections do you have left: None

## 2025-07-23 RX ORDER — TIRZEPATIDE 10 MG/.5ML
10 INJECTION, SOLUTION SUBCUTANEOUS WEEKLY
Qty: 2 ML | Refills: 3 | Status: SHIPPED | OUTPATIENT
Start: 2025-07-23 | End: 2025-11-12

## 2025-07-23 NOTE — TELEPHONE ENCOUNTER
Patient called the refill line again for this medication. Informed patient the message was sent for review and that refills can take up to 72 hours to process.

## 2025-08-07 ENCOUNTER — TELEPHONE (OUTPATIENT)
Age: 45
End: 2025-08-07

## 2025-08-13 PROBLEM — D25.0 SUBMUCOUS MYOMA OF UTERUS: Status: RESOLVED | Noted: 2025-03-24 | Resolved: 2025-08-13

## 2025-08-13 PROBLEM — N93.9 ABNORMAL UTERINE BLEEDING (AUB): Status: RESOLVED | Noted: 2025-03-24 | Resolved: 2025-08-13

## 2025-08-14 ENCOUNTER — OFFICE VISIT (OUTPATIENT)
Age: 45
End: 2025-08-14
Payer: COMMERCIAL

## (undated) DEVICE — PREMIUM DRY TRAY LF: Brand: MEDLINE INDUSTRIES, INC.

## (undated) DEVICE — LARGE NEEDLE DRIVER: Brand: ENDOWRIST

## (undated) DEVICE — AIRSEAL TUBE SMOKE EVAC LUMENX3 FILTERED

## (undated) DEVICE — SEAL

## (undated) DEVICE — GLOVE INDICATOR PI UNDERGLOVE SZ 7 BLUE

## (undated) DEVICE — ANTIBACTERIAL VIOLET BRAIDED (POLYGLACTIN 910), SYNTHETIC ABSORBABLE SUTURE: Brand: COATED VICRYL

## (undated) DEVICE — STERILE CYSTO PACK: Brand: CARDINAL HEALTH

## (undated) DEVICE — TIP COVER ACCESSORY

## (undated) DEVICE — KIT, BETHLEHEM ROBOTIC PROST: Brand: CARDINAL HEALTH

## (undated) DEVICE — UTERINE MANIPULATOR RUMI 6.7 X 10 CM

## (undated) DEVICE — MONOPOLAR CURVED SCISSORS: Brand: ENDOWRIST

## (undated) DEVICE — 1820 FOAM BLOCK NEEDLE COUNTER: Brand: DEVON

## (undated) DEVICE — COLUMN DRAPE

## (undated) DEVICE — TRAY FOLEY 16FR URIMETER SILICONE SURESTEP

## (undated) DEVICE — SUT MONOCRYL 4-0 PS-2 27 IN Y426H

## (undated) DEVICE — MAYO STAND COVER: Brand: CONVERTORS

## (undated) DEVICE — BLADELESS OBTURATOR: Brand: WECK VISTA

## (undated) DEVICE — GLOVE INDICATOR PI UNDERGLOVE SZ 8 BLUE

## (undated) DEVICE — OCCLUDER COLPO-PNEUMO

## (undated) DEVICE — 40595 XL TRENDELENBURG POSITIONING KIT: Brand: 40595 XL TRENDELENBURG POSITIONING KIT

## (undated) DEVICE — INTENDED FOR TISSUE SEPARATION, AND OTHER PROCEDURES THAT REQUIRE A SHARP SURGICAL BLADE TO PUNCTURE OR CUT.: Brand: BARD-PARKER SAFETY BLADES SIZE 11, STERILE

## (undated) DEVICE — GLOVE PI ULTRA TOUCH SZ.7.5

## (undated) DEVICE — REM POLYHESIVE ADULT PATIENT RETURN ELECTRODE: Brand: VALLEYLAB

## (undated) DEVICE — TROCAR PORT ACCESS 5 X120MML W/BLDLS OPTICAL TIP AIRSEAL

## (undated) DEVICE — EXOFIN PRECISION PEN HIGH VISCOSITY TOPICAL SKIN ADHESIVE: Brand: EXOFIN PRECISION PEN, 1G

## (undated) DEVICE — FENESTRATED BIPOLAR FORCEPS: Brand: ENDOWRIST

## (undated) DEVICE — SYRINGE 50ML LL

## (undated) DEVICE — ARM DRAPE

## (undated) DEVICE — VISUALIZATION SYSTEM: Brand: CLEARIFY

## (undated) DEVICE — SUT STRATAFIX SPIRAL 2-0 CT-1 30 CM SXPP1B410

## (undated) DEVICE — PROGRASP FORCEPS: Brand: ENDOWRIST

## (undated) DEVICE — INTENDED FOR TISSUE SEPARATION, AND OTHER PROCEDURES THAT REQUIRE A SHARP SURGICAL BLADE TO PUNCTURE OR CUT.: Brand: BARD-PARKER SAFETY BLADES SIZE 15, STERILE

## (undated) DEVICE — ELECTRO LUBE IS A SINGLE PATIENT USE DEVICE THAT IS INTENDED TO BE USED ON ELECTROSURGICAL ELECTRODES TO REDUCE STICKING.: Brand: KEY SURGICAL ELECTRO LUBE

## (undated) DEVICE — CHLORHEXIDINE 4PCT 4 OZ